# Patient Record
Sex: FEMALE | Race: WHITE | NOT HISPANIC OR LATINO | Employment: OTHER | URBAN - METROPOLITAN AREA
[De-identification: names, ages, dates, MRNs, and addresses within clinical notes are randomized per-mention and may not be internally consistent; named-entity substitution may affect disease eponyms.]

---

## 2018-12-10 ENCOUNTER — OFFICE VISIT (OUTPATIENT)
Dept: FAMILY MEDICINE | Facility: CLINIC | Age: 48
End: 2018-12-10
Payer: COMMERCIAL

## 2018-12-10 VITALS
OXYGEN SATURATION: 98 % | DIASTOLIC BLOOD PRESSURE: 82 MMHG | HEART RATE: 67 BPM | RESPIRATION RATE: 16 BRPM | WEIGHT: 160.4 LBS | SYSTOLIC BLOOD PRESSURE: 138 MMHG | TEMPERATURE: 98.1 F

## 2018-12-10 DIAGNOSIS — F32.A ANXIETY AND DEPRESSION: ICD-10-CM

## 2018-12-10 DIAGNOSIS — M19.90 ARTHRITIS: ICD-10-CM

## 2018-12-10 DIAGNOSIS — Z00.00 ROUTINE PHYSICAL EXAMINATION: Primary | ICD-10-CM

## 2018-12-10 DIAGNOSIS — F41.9 ANXIETY AND DEPRESSION: ICD-10-CM

## 2018-12-10 PROBLEM — M16.0 PRIMARY OSTEOARTHRITIS OF BOTH HIPS: Status: ACTIVE | Noted: 2018-12-10

## 2018-12-10 PROCEDURE — 99396 PREV VISIT EST AGE 40-64: CPT | Performed by: FAMILY MEDICINE

## 2018-12-10 RX ORDER — ALPRAZOLAM 0.5 MG/1
0.5 TABLET ORAL DAILY PRN
Qty: 30 TABLET | Refills: 0 | Status: SHIPPED | OUTPATIENT
Start: 2018-12-10 | End: 2019-03-17 | Stop reason: SDUPTHER

## 2018-12-10 RX ORDER — MULTIVIT WITH MINERALS/HERBS
TABLET ORAL
COMMUNITY
Start: 2016-12-20 | End: 2020-02-21 | Stop reason: ENTERED-IN-ERROR

## 2018-12-10 RX ORDER — SERTRALINE HYDROCHLORIDE 100 MG/1
100 TABLET, FILM COATED ORAL DAILY
Qty: 90 TABLET | Refills: 1 | Status: SHIPPED | OUTPATIENT
Start: 2018-12-10 | End: 2019-03-20 | Stop reason: SDUPTHER

## 2018-12-10 RX ORDER — MELOXICAM 7.5 MG/1
7.5 TABLET ORAL
Refills: 3 | COMMUNITY
Start: 2018-11-30 | End: 2020-12-11 | Stop reason: ENTERED-IN-ERROR

## 2018-12-10 ASSESSMENT — ENCOUNTER SYMPTOMS
HEMATURIA: 0
ARTHRALGIAS: 1
SINUS PRESSURE: 0
EYE PAIN: 0
CONSTIPATION: 0
FEVER: 0
HEADACHES: 0
DIZZINESS: 0
COUGH: 0
WEAKNESS: 0
CHEST TIGHTNESS: 0
FATIGUE: 0
WHEEZING: 0
RHINORRHEA: 0
NERVOUS/ANXIOUS: 1
BRUISES/BLEEDS EASILY: 0
MYALGIAS: 0
DIARRHEA: 0
SORE THROAT: 0
ABDOMINAL PAIN: 0
SLEEP DISTURBANCE: 1
FREQUENCY: 0
SINUS PAIN: 0
SHORTNESS OF BREATH: 0
CHILLS: 0
PALPITATIONS: 0

## 2018-12-10 NOTE — PROGRESS NOTES
Subjective      Patient ID: Bailey Ordonez is a 48 y.o. female.    She is here for Pe. Pt has been doing ok overall. She is trying follow well balanced meals. Stays well hydrated. She is limited in her activity b/c of her arthritis and saw ortho and placed her mobic and hoping it will help with movement and planning to get hip replacement sometime next yr. Average 7hrs of sleep a night. Normal BMs and urination. Normal menstrual cycles. Due for upcoming annual gyn. UTD with mammo. Due for eye check. UTD with dental exam. UTD with colonoscopy and doing them every 5yrs. Due for fasting labs. Will be getting flu shot next wk. Mood has been ok but does feel she needs to increase zoloft to 100mg and also would like refill of xanax to use as needed.         The following have been reviewed and updated as appropriate in this visit:  Tobacco  Allergies  Meds  Problems  Med Hx  Surg Hx  Fam Hx       Review of Systems   Constitutional: Negative for chills, fatigue and fever.   HENT: Negative for ear pain, postnasal drip, rhinorrhea, sinus pain, sinus pressure and sore throat.    Eyes: Negative for pain and visual disturbance.   Respiratory: Negative for cough, chest tightness, shortness of breath and wheezing.    Cardiovascular: Negative for chest pain and palpitations.   Gastrointestinal: Negative for abdominal pain, constipation and diarrhea.   Genitourinary: Negative for decreased urine volume, frequency and hematuria.   Musculoskeletal: Positive for arthralgias. Negative for myalgias.   Skin: Negative for rash.   Neurological: Negative for dizziness, weakness and headaches.   Hematological: Does not bruise/bleed easily.   Psychiatric/Behavioral: Positive for sleep disturbance. Negative for behavioral problems and suicidal ideas. The patient is nervous/anxious.        Current Outpatient Prescriptions   Medication Sig Dispense Refill   • b complex vitamins tablet take 1 tablet by oral route  every day     •  cholecalciferol, vitamin D3, (VITAMIN D3) 4,000 unit capsule 1 tab PO daily     • ALPRAZolam (XANAX) 0.5 mg tablet Take 1 tablet (0.5 mg total) by mouth daily as needed for anxiety. 30 tablet 0   • meloxicam (MOBIC) 7.5 mg tablet Take 7.5 mg by mouth once daily.  3   • sertraline (ZOLOFT) 100 mg tablet Take 1 tablet (100 mg total) by mouth daily. 90 tablet 1     No current facility-administered medications for this visit.      Past Medical History:   Diagnosis Date   • Anxiety and depression    • Osteoarthritis of both hips      Family History   Problem Relation Age of Onset   • Heart disease Mother    • Hypertension Mother    • Atrial fibrillation Mother    • Heart failure Mother    • Asthma Mother    • Factor V Leiden deficiency Mother    • Pancreatic cancer Father    • Factor V Leiden deficiency Brother      Past Surgical History:   Procedure Laterality Date   • KNEE SURGERY     • TONSILLECTOMY       Social History     Social History   • Marital status:      Spouse name: N/A   • Number of children: N/A   • Years of education: N/A     Occupational History   • Not on file.     Social History Main Topics   • Smoking status: Never Smoker   • Smokeless tobacco: Never Used   • Alcohol use Yes      Comment: social    • Drug use: Unknown   • Sexual activity: Not on file     Other Topics Concern   • Not on file     Social History Narrative   • No narrative on file     Allergies   Allergen Reactions   • Compazine [Prochlorperazine]      Seizure          Objective   /82 (BP Location: Right upper arm, Patient Position: Sitting)   Pulse 67   Temp 36.7 °C (98.1 °F) (Oral)   Resp 16   Wt 72.8 kg (160 lb 6.4 oz)   LMP 11/20/2018   SpO2 98%   Physical Exam   Constitutional: She is oriented to person, place, and time. She appears well-developed and well-nourished.   HENT:   Head: Normocephalic and atraumatic.   Right Ear: External ear normal.   Left Ear: External ear normal.   Eyes: Conjunctivae are normal.  Pupils are equal, round, and reactive to light.   Neck: Normal range of motion. Neck supple. No thyromegaly present.   Cardiovascular: Normal rate, regular rhythm and normal heart sounds.    No murmur heard.  Pulmonary/Chest: Effort normal and breath sounds normal. She has no wheezes.   Abdominal: Soft. Bowel sounds are normal. There is no tenderness.   Musculoskeletal: Normal range of motion. She exhibits no tenderness.   Lymphadenopathy:     She has no cervical adenopathy.   Neurological: She is alert and oriented to person, place, and time. No cranial nerve deficit.   Skin: Skin is warm and dry. No rash noted.   Psychiatric: She has a normal mood and affect.   Nursing note and vitals reviewed.      Assessment/Plan   Problem List Items Addressed This Visit     Anxiety and depression     Could be better so will increase zoloft to 100mg daily and check in about 6wks to see how she is doing. Will refill xanax to use as needed.         Relevant Medications    sertraline (ZOLOFT) 100 mg tablet    ALPRAZolam (XANAX) 0.5 mg tablet      Other Visit Diagnoses     Routine physical examination    -  Primary    Relevant Orders    Comprehensive metabolic panel    TSH 3rd Generation    Lipid panel    CBC    Urinalysis with Reflex Culture    Arthritis        She is concerned she has Psa arthritis so will check labs and have her see rheumo.    Relevant Orders    ARLEN    Rheumatoid factor      Pe- Pt doing well overall. Discussed diet and exercise. UTD with gyn care. UTD with colonoscopy. Will check fasting labs. UTD with dental exam. Will check eye exam.     Layne Kramer, DO  12/10/2018

## 2018-12-11 NOTE — ASSESSMENT & PLAN NOTE
Could be better so will increase zoloft to 100mg daily and check in about 6wks to see how she is doing. Will refill xanax to use as needed.

## 2018-12-20 ENCOUNTER — APPOINTMENT (OUTPATIENT)
Dept: LAB | Age: 48
End: 2018-12-20
Attending: FAMILY MEDICINE
Payer: COMMERCIAL

## 2018-12-20 DIAGNOSIS — M19.90 ARTHRITIS: ICD-10-CM

## 2018-12-20 DIAGNOSIS — Z00.00 ROUTINE PHYSICAL EXAMINATION: ICD-10-CM

## 2018-12-20 LAB
ALBUMIN SERPL-MCNC: 4.1 G/DL (ref 3.4–5)
ALP SERPL-CCNC: 67 IU/L (ref 35–126)
ALT SERPL-CCNC: 47 IU/L (ref 11–54)
ANION GAP SERPL CALC-SCNC: 8 MEQ/L (ref 3–15)
AST SERPL-CCNC: 27 IU/L (ref 15–41)
BACTERIA URNS QL MICRO: 1 /HPF
BILIRUB SERPL-MCNC: 1 MG/DL (ref 0.3–1.2)
BILIRUB UR QL STRIP.AUTO: NEGATIVE MG/DL
BUN SERPL-MCNC: 9 MG/DL (ref 8–20)
CALCIUM SERPL-MCNC: 9.2 MG/DL (ref 8.9–10.3)
CHLORIDE SERPL-SCNC: 100 MEQ/L (ref 98–109)
CHOLEST SERPL-MCNC: 268 MG/DL
CLARITY UR REFRACT.AUTO: CLEAR
CO2 SERPL-SCNC: 28 MEQ/L (ref 22–32)
COLOR UR AUTO: YELLOW
CREAT SERPL-MCNC: 0.7 MG/DL
ERYTHROCYTE [DISTWIDTH] IN BLOOD BY AUTOMATED COUNT: 11.9 % (ref 11.7–14.4)
GFR SERPL CREATININE-BSD FRML MDRD: >60 ML/MIN/1.73M*2
GLUCOSE SERPL-MCNC: 100 MG/DL (ref 70–99)
GLUCOSE UR STRIP.AUTO-MCNC: NEGATIVE MG/DL
HCT VFR BLDCO AUTO: 40.7 %
HDLC SERPL-MCNC: 52 MG/DL
HDLC SERPL: 5.2 {RATIO}
HGB BLD-MCNC: 14.4 G/DL
HGB UR QL STRIP.AUTO: NEGATIVE
HYALINE CASTS #/AREA URNS LPF: ABNORMAL /LPF
KETONES UR STRIP.AUTO-MCNC: NEGATIVE MG/DL
LDLC SERPL CALC-MCNC: 177 MG/DL
LEUKOCYTE ESTERASE UR QL STRIP.AUTO: 1
MCH RBC QN AUTO: 34.1 PG (ref 28–33.2)
MCHC RBC AUTO-ENTMCNC: 35.4 G/DL (ref 32.2–35.5)
MCV RBC AUTO: 96.4 FL (ref 83–98)
NITRITE UR QL STRIP.AUTO: NEGATIVE
NONHDLC SERPL-MCNC: 216 MG/DL
PDW BLD AUTO: 10.9 FL (ref 9.4–12.3)
PH UR STRIP.AUTO: 6 [PH]
PLATELET # BLD AUTO: 236 K/UL
POTASSIUM SERPL-SCNC: 4.3 MEQ/L (ref 3.6–5.1)
PROT SERPL-MCNC: 6.6 G/DL (ref 6–8.2)
PROT UR QL STRIP.AUTO: NEGATIVE
RBC # BLD AUTO: 4.22 M/UL (ref 3.93–5.22)
RBC #/AREA URNS HPF: ABNORMAL /HPF
SODIUM SERPL-SCNC: 136 MEQ/L (ref 136–144)
SP GR UR REFRACT.AUTO: 1.02
SQUAMOUS URNS QL MICRO: 1 /HPF
TRIGL SERPL-MCNC: 194 MG/DL (ref 30–149)
TSH SERPL DL<=0.05 MIU/L-ACNC: 1.62 MIU/L (ref 0.34–5.6)
UROBILINOGEN UR STRIP-ACNC: 0.2 EU/DL
WBC # BLD AUTO: 4.74 K/UL
WBC #/AREA URNS HPF: ABNORMAL /HPF

## 2018-12-20 PROCEDURE — 87086 URINE CULTURE/COLONY COUNT: CPT

## 2018-12-20 PROCEDURE — 80048 BASIC METABOLIC PNL TOTAL CA: CPT | Mod: 59

## 2018-12-20 PROCEDURE — 80061 LIPID PANEL: CPT

## 2018-12-20 PROCEDURE — 86431 RHEUMATOID FACTOR QUANT: CPT

## 2018-12-20 PROCEDURE — 81003 URINALYSIS AUTO W/O SCOPE: CPT

## 2018-12-20 PROCEDURE — 36415 COLL VENOUS BLD VENIPUNCTURE: CPT

## 2018-12-20 PROCEDURE — 84443 ASSAY THYROID STIM HORMONE: CPT

## 2018-12-20 PROCEDURE — 85027 COMPLETE CBC AUTOMATED: CPT

## 2018-12-20 PROCEDURE — 86038 ANTINUCLEAR ANTIBODIES: CPT

## 2018-12-21 LAB
ANA SER QL IA: NEGATIVE
BACTERIA UR CULT: ABNORMAL
BACTERIA UR CULT: ABNORMAL
RHEUMATOID FACT SERPL-ACNC: <20 IU/ML

## 2018-12-23 ENCOUNTER — TELEPHONE (OUTPATIENT)
Dept: FAMILY MEDICINE | Facility: CLINIC | Age: 48
End: 2018-12-23

## 2018-12-23 DIAGNOSIS — E78.00 PURE HYPERCHOLESTEROLEMIA: Primary | ICD-10-CM

## 2019-01-02 ENCOUNTER — TELEPHONE (OUTPATIENT)
Dept: FAMILY MEDICINE | Facility: CLINIC | Age: 49
End: 2019-01-02

## 2019-03-17 DIAGNOSIS — F32.A ANXIETY AND DEPRESSION: ICD-10-CM

## 2019-03-17 DIAGNOSIS — F41.9 ANXIETY AND DEPRESSION: ICD-10-CM

## 2019-03-17 RX ORDER — ALPRAZOLAM 0.5 MG/1
0.5 TABLET ORAL DAILY PRN
Qty: 30 TABLET | Refills: 0 | Status: SHIPPED | OUTPATIENT
Start: 2019-03-17 | End: 2019-08-04 | Stop reason: SDUPTHER

## 2019-03-20 DIAGNOSIS — F32.A ANXIETY AND DEPRESSION: ICD-10-CM

## 2019-03-20 DIAGNOSIS — F41.9 ANXIETY AND DEPRESSION: ICD-10-CM

## 2019-03-20 RX ORDER — SERTRALINE HYDROCHLORIDE 100 MG/1
100 TABLET, FILM COATED ORAL DAILY
Qty: 90 TABLET | Refills: 0 | Status: SHIPPED | OUTPATIENT
Start: 2019-03-20 | End: 2019-09-26 | Stop reason: SDUPTHER

## 2019-03-27 ENCOUNTER — CONSULT (OUTPATIENT)
Dept: FAMILY MEDICINE | Facility: CLINIC | Age: 49
End: 2019-03-27
Payer: COMMERCIAL

## 2019-03-27 VITALS
BODY MASS INDEX: 35.08 KG/M2 | SYSTOLIC BLOOD PRESSURE: 138 MMHG | TEMPERATURE: 97.9 F | RESPIRATION RATE: 18 BRPM | HEIGHT: 72 IN | HEART RATE: 72 BPM | OXYGEN SATURATION: 98 % | DIASTOLIC BLOOD PRESSURE: 82 MMHG | WEIGHT: 259 LBS

## 2019-03-27 DIAGNOSIS — Z01.818 PRE-OPERATIVE CLEARANCE: Primary | ICD-10-CM

## 2019-03-27 DIAGNOSIS — M16.12 PRIMARY OSTEOARTHRITIS OF LEFT HIP: ICD-10-CM

## 2019-03-27 PROCEDURE — 99243 OFF/OP CNSLTJ NEW/EST LOW 30: CPT | Performed by: FAMILY MEDICINE

## 2019-03-27 ASSESSMENT — ENCOUNTER SYMPTOMS
CONSTIPATION: 0
NAUSEA: 0
DIARRHEA: 0
FEVER: 0
VOMITING: 0
UNEXPECTED WEIGHT CHANGE: 0
WEAKNESS: 0
ABDOMINAL DISTENTION: 0
BLOOD IN STOOL: 0
FATIGUE: 0
ADENOPATHY: 0
HEADACHES: 0
DIFFICULTY URINATING: 0
LIGHT-HEADEDNESS: 0
ABDOMINAL PAIN: 0
DIZZINESS: 0
COUGH: 0
PALPITATIONS: 0
WHEEZING: 0
TROUBLE SWALLOWING: 0
SHORTNESS OF BREATH: 0

## 2019-03-27 NOTE — PROGRESS NOTES
Daily Progress Note       Patient ID: Bailey Ordonez is a 48 y.o. female.    HPI    48 year old female presents for pre-operative clearance.     Patient is scheduled for total left hip replacement to be done by Dr Buck Garibay on 4/15/2019 at [facility]. Will be having pre-operative labs done at [place] on 4/10/2019.     She denies chest pain, shortness of breath, palpitations, syncopal or near syncopal episodes.     She reports she is limited with activity due to pain but otherwise able to walk multiple miles. She even uses the stationary bike for 20-30 minutes without limitations.     Her previous cardiac work up includes a CT Heart Coronary Calcium Score done on 8/3/2016 where she had a score of 0.     She does have a hx of Factor V Leiden mutation     No complications with anesthesia in the past.     The following have been reviewed and updated as appropriate in this visit:  Tobacco  Allergies  Meds  Problems  Med Hx  Surg Hx  Fam Hx       Review of Systems   Constitutional: Negative for fatigue, fever and unexpected weight change.   HENT: Negative for trouble swallowing.    Eyes: Negative for visual disturbance.   Respiratory: Negative for cough, shortness of breath and wheezing.    Cardiovascular: Negative for chest pain, palpitations and leg swelling.   Gastrointestinal: Negative for abdominal distention, abdominal pain, blood in stool, constipation, diarrhea, nausea and vomiting.   Endocrine: Negative for cold intolerance and heat intolerance.   Genitourinary: Negative for difficulty urinating.   Skin: Negative for rash.   Neurological: Negative for dizziness, syncope, weakness, light-headedness and headaches.   Hematological: Negative for adenopathy.             Current Outpatient Prescriptions   Medication Sig Dispense Refill   • ALPRAZolam (XANAX) 0.5 mg tablet TAKE 1 TABLET (0.5 MG TOTAL) BY MOUTH DAILY AS NEEDED FOR ANXIETY. 30 tablet 0   • b complex vitamins tablet take 1  tablet by oral route  every day     • cholecalciferol, vitamin D3, (VITAMIN D3) 4,000 unit capsule 1 tab PO daily     • meloxicam (MOBIC) 7.5 mg tablet Take 7.5 mg by mouth once daily.  3   • RED YEAST RICE ORAL Take by mouth.     • sertraline (ZOLOFT) 100 mg tablet Take 1 tablet (100 mg total) by mouth daily. 90 tablet 0     No current facility-administered medications for this visit.      Past Medical History:   Diagnosis Date   • Anxiety and depression    • Osteoarthritis of both hips      Family History   Problem Relation Age of Onset   • Heart disease Mother    • Hypertension Mother    • Atrial fibrillation Mother    • Heart failure Mother    • Asthma Mother    • Factor V Leiden deficiency Mother    • Pancreatic cancer Father    • Factor V Leiden deficiency Brother      Past Surgical History:   Procedure Laterality Date   • KNEE SURGERY     • TONSILLECTOMY       Social History     Social History   • Marital status:      Spouse name: N/A   • Number of children: N/A   • Years of education: N/A     Occupational History   • Not on file.     Social History Main Topics   • Smoking status: Never Smoker   • Smokeless tobacco: Never Used   • Alcohol use Yes      Comment: social    • Drug use: Unknown   • Sexual activity: Not on file     Other Topics Concern   • Not on file     Social History Narrative   • No narrative on file     Allergies   Allergen Reactions   • Compazine [Prochlorperazine]      Seizure          Objective   No new labs.    Vitals:    03/27/19 1549   BP: 138/82   Pulse: 72   Resp: 18   Temp: 36.6 °C (97.9 °F)   SpO2: 98%   Weight: 117 kg (259 lb)   Height: 1.829 m (6')         Physical Exam   Constitutional: She is oriented to person, place, and time. She appears well-developed and well-nourished.   HENT:   Head: Normocephalic and atraumatic.   Eyes: Conjunctivae and EOM are normal. Pupils are equal, round, and reactive to light.   Neck: Normal range of motion. Neck supple.   Cardiovascular:  Normal rate, regular rhythm and normal heart sounds.    Pulmonary/Chest: Effort normal and breath sounds normal. No respiratory distress.   Abdominal: Soft. Bowel sounds are normal. She exhibits no distension. There is no tenderness.   Musculoskeletal: Normal range of motion. She exhibits no edema.   Neurological: She is alert and oriented to person, place, and time.   Skin: Skin is warm and dry.   Nursing note and vitals reviewed.      Assessment/Plan   Problem List Items Addressed This Visit     None      Visit Diagnoses     Pre-operative clearance    -  Primary    Primary osteoarthritis of left hip          After examining the patient and reviewing the preoperative data - pending significant abnormality on EKG and preoperative laboratory examination - I find this patient to be medically stable for total left hip replacement to be done by Dr Buck Garibay on 4/15/2019 at [facility]. Will be having pre-operative labs done on 4/10/2019.       Joss Maciel MD  3/27/2019

## 2019-03-27 NOTE — PATIENT INSTRUCTIONS
Patient Education     Preparing for Hip Replacement  Recovery from hip replacement surgery can be made easier and more comfortable by being prepared before surgery. This includes:  · Arranging for others to help you.  · Preparing your home.  · Preparing your body by having a preoperative exam and being as healthy as you can.  · Doing exercises before your surgery as directed by your health care provider.  You can ease any concerns about your financial responsibilities by calling your insurance company after you decide to have surgery. In addition to asking about your surgery and hospital stay, you will want to ask about coverage for medical equipment, rehabilitation facilities, and home care.  How should I arrange for help?  You will be stronger and more mobile every day. However, in the first couple weeks after surgery, it is unlikely you will be able to do all your daily activities as easily as before your surgery. You may tire easily and will still have limited movement in your leg. Follow these guidelines to best arrange for the help you may need after your surgery:  · Plan to have someone take you home after the procedure. Your health care provider will be able to tell you how many days you can expect to be in the hospital.  · Cancel all work, caregiving, and volunteer responsibilities for at least 4-6 weeks after surgery.  · If you live alone, arrange for someone to care for your home and pets for the first 4-6 weeks after surgery.  · Select someone with whom you feel comfortable to be with you day and night for the first week. This person will help you with your exercises and personal care, such as bathing and using the toilet.  · Arrange for drivers to bring you to and from your follow-up appointments, the grocery store, and other places you may need to go for at least 4-6 weeks.  How should I prepare my home?  · Pick a recovery spot, but do not plan on recovering in bed. Sitting in a more upright position  is better for your health. You may want to use a recliner with a small table nearby. Choose a chair with a firm seat that will not allow you to sink down into it. Chairs and sofas that are too soft can allow your hip to bend at an angle greater than 90 degrees. This could put you at risk for dislocating your new hip joint. Place the items you use most frequently on the small table. These may include the TV remote, a cordless phone, a book or laptop computer, a water glass, and any other items of your choice.  · Remove all clutter from your floors. Also remove any throw rugs.  · To see if you will be able to move in your home with a wheeled walker, hold your hands out about 6 inches (15 cm) from your sides. Walk from your recovery spot to your kitchen and bathroom. Then walk from your bed to the bathroom. If you do not hit anything with your hands, you have enough room.  · Move the items you use most often in your kitchen, bathroom, and bedroom to shelves and drawers that are at countertop height.  · Prepare a few meals to freeze and reheat later.  · Consider adding grab bars in the shower and near the toilet.  · While you are in the hospital, you will learn about equipment that can be helpful for your recovery. Some of the equipment includes raised toilet seats, tub benches, and shower benches.  How should I prepare my body?  · Have a preoperative exam. This will ensure that your body is healthy enough to safely have this surgery. Bring a complete list of all your medicines and supplements, including herbs and vitamins. You may need to have additional tests to ensure your safety.  · Have elective dental care and routine cleanings before your surgery. Germs from anywhere in your body, including your mouth, can travel to your new joint and infect it. It is important not to have any dental work performed for at least 3 months after your surgery. After surgery, be sure to tell your dentist about your joint  replacement.  · Maintain a healthy diet. Do not change your diet before surgery unless advised to do so by your health care provider.  · Do not use any tobacco products, including cigarettes, chewing tobacco, or electronic cigarettes. If you need help quitting, ask your health care provider. Tobacco and nicotine products can delay healing after your surgery.  · The day before your surgery, follow your health care provider’s directions for showering, eating, drinking, and taking medicines. These directions are for your safety.  What kinds of exercises should I do?  Your health care provider may have you do the following exercises before your surgery. Be sure to follow the exercise program only as directed by your health care provider. While completing these exercises, remember to stretch for as long as you can, up to 30 seconds. You should only feel a gentle lengthening or release in the stretched tissue. You should not feel pain.  Ankle Pumps  1. While sitting on a firm surface with your legs straight out in front of you, move your feet at the ankle joints so that your toes are pulling back toward your chest.  2. Reverse the motion, pointing your toes away from you.  3. Repeat 10-20 times. Complete this exercise 1-2 times per day.  Heel Slides  1. Lie on your back with both knees straight. (If this causes back pain, bend one knee, placing your foot flat on the floor. Keep this leg in this position while doing heel slides with the opposite leg.)  2. Slowly slide one heel back toward your buttocks until you feel a gentle stretch in the front of your knee or thigh.  3. Slowly slide your heel back to the starting position.  4. Repeat 10-20 times, then switch heels and do it again. Complete this exercise 1-2 times per day.  Quadriceps Sets  1. Lie on your back with one leg extended and your opposite knee bent.  2. Gradually tighten the muscles in the front of the thigh of your extended leg. This motion will push the back  of the knee down toward the floor.  3. Hold the muscle as tightly as you can without causing pain for 10 seconds.  4. Relax the muscles slowly and completely in between each repetition.  5. Repeat 10-20 times, then switch legs and do it again. Complete this exercise 1-2 times per day.  Short Arc Kicks  1. Lie on your back. Place a rolled towel (4-6 inches [10-15 cm] high) under one knee so that the knee slightly bends.  2. Raise only the lower leg of your slightly bent leg by tightening the muscles in the front of your thigh. Do not allow your thigh to rise.  3. Hold this position for 5 seconds.  4. Repeat 10-20 times, then switch legs and do it again. Complete this exercise 1-2 times per day.  Straight Leg Raises  1. Lie on your back with one leg extended and your opposite knee bent.  2. Tighten the muscles in the front of the thigh of your extended leg. Your thigh may shake slightly.  3. Tighten these muscles even more and raise your leg 4-6 inches off the floor. Hold for 3-5 seconds.  4. Keeping these muscles tight, lower your leg.  5. Relax the muscles slowly and completely in between each repetition.  6. Repeat 10-20 times, then switch legs and do it again. Complete this exercise 1-2 times per day.  Arm Chair Push-ups  1. Find a firm, non-wheeled chair with solid armrests.  2. Sitting in the chair, extend one leg straight out in front of you.  3. Lift up your body weight, using your arms and opposite leg.  4. Slowly lower your body weight.  5. Repeat 10-20 times, then switch legs and do it again. Complete this exercise 1-2 times per day.  This information is not intended to replace advice given to you by your health care provider. Make sure you discuss any questions you have with your health care provider.  Document Released: 03/23/2012 Document Revised: 05/22/2017 Document Reviewed: 03/12/2015  Elsevier Interactive Patient Education © 2017 [employer]

## 2019-04-11 ENCOUNTER — TELEPHONE (OUTPATIENT)
Dept: FAMILY MEDICINE | Facility: CLINIC | Age: 49
End: 2019-04-11

## 2019-04-11 NOTE — TELEPHONE ENCOUNTER
Form was completed by Dr. Maciel and faxed to number on form and confirmation received.  Pt was notified, pw scanned into chart

## 2019-04-11 NOTE — TELEPHONE ENCOUNTER
Please keep an eye out for patient's preop labs. Had drawn yesterday at [facility]. She says they may be coming over by fax addressed to Dr Kramer, as they could not find Dr Maciel in their system. Will need to call [place] if we did not receive. Surgery is 4/15/19.  Then needs to be faxed with preop clearance note to Dr Garibay at [phone], attn: Rehana.  Thank you !

## 2019-08-04 DIAGNOSIS — F32.A ANXIETY AND DEPRESSION: ICD-10-CM

## 2019-08-04 DIAGNOSIS — F41.9 ANXIETY AND DEPRESSION: ICD-10-CM

## 2019-08-04 RX ORDER — ALPRAZOLAM 0.5 MG/1
0.5 TABLET ORAL DAILY PRN
Qty: 30 TABLET | Refills: 0 | Status: SHIPPED | OUTPATIENT
Start: 2019-08-04 | End: 2020-02-21 | Stop reason: SDUPTHER

## 2019-09-26 DIAGNOSIS — F32.A ANXIETY AND DEPRESSION: ICD-10-CM

## 2019-09-26 DIAGNOSIS — F41.9 ANXIETY AND DEPRESSION: ICD-10-CM

## 2019-09-26 RX ORDER — SERTRALINE HYDROCHLORIDE 100 MG/1
100 TABLET, FILM COATED ORAL DAILY
Qty: 90 TABLET | Refills: 1 | Status: SHIPPED | OUTPATIENT
Start: 2019-09-26 | End: 2020-02-21 | Stop reason: SDUPTHER

## 2019-09-26 NOTE — TELEPHONE ENCOUNTER
From: Bailey Ordonez  Sent: 9/26/2019 5:40 AM EDT  Subject: Medication Renewal Request    Bailey Ordonez would like a refill of the following medications:     sertraline (ZOLOFT) 100 mg tablet [Layne Kramer, DO]    Preferred pharmacy: [facility] [id number] [place] [address]  Delivery method: Pickup  Preferred pick-up date and time: 9/27/2019 4:00 PM

## 2020-02-21 ENCOUNTER — OFFICE VISIT (OUTPATIENT)
Dept: FAMILY MEDICINE | Facility: CLINIC | Age: 50
End: 2020-02-21
Payer: COMMERCIAL

## 2020-02-21 VITALS
WEIGHT: 270.2 LBS | TEMPERATURE: 98 F | HEART RATE: 68 BPM | BODY MASS INDEX: 37.83 KG/M2 | SYSTOLIC BLOOD PRESSURE: 130 MMHG | OXYGEN SATURATION: 98 % | RESPIRATION RATE: 16 BRPM | HEIGHT: 71 IN | DIASTOLIC BLOOD PRESSURE: 85 MMHG

## 2020-02-21 DIAGNOSIS — R53.83 OTHER FATIGUE: ICD-10-CM

## 2020-02-21 DIAGNOSIS — Z00.00 ROUTINE PHYSICAL EXAMINATION: Primary | ICD-10-CM

## 2020-02-21 DIAGNOSIS — F32.A ANXIETY AND DEPRESSION: ICD-10-CM

## 2020-02-21 DIAGNOSIS — F41.9 ANXIETY AND DEPRESSION: ICD-10-CM

## 2020-02-21 DIAGNOSIS — R21 RASH: ICD-10-CM

## 2020-02-21 DIAGNOSIS — Z12.39 SCREENING FOR BREAST CANCER: ICD-10-CM

## 2020-02-21 PROCEDURE — 99396 PREV VISIT EST AGE 40-64: CPT | Performed by: FAMILY MEDICINE

## 2020-02-21 RX ORDER — SERTRALINE HYDROCHLORIDE 100 MG/1
100 TABLET, FILM COATED ORAL DAILY
Qty: 90 TABLET | Refills: 1 | Status: SHIPPED | OUTPATIENT
Start: 2020-02-21 | End: 2020-12-15 | Stop reason: SDUPTHER

## 2020-02-21 RX ORDER — ALPRAZOLAM 0.5 MG/1
0.5 TABLET ORAL DAILY PRN
Qty: 30 TABLET | Refills: 0 | Status: SHIPPED | OUTPATIENT
Start: 2020-02-21 | End: 2020-05-21

## 2020-02-21 ASSESSMENT — ENCOUNTER SYMPTOMS
SHORTNESS OF BREATH: 0
EYE PAIN: 0
RHINORRHEA: 0
CHILLS: 0
ABDOMINAL PAIN: 0
MYALGIAS: 0
SINUS PAIN: 0
HEMATURIA: 0
DIARRHEA: 0
CHEST TIGHTNESS: 0
SLEEP DISTURBANCE: 1
FREQUENCY: 0
ARTHRALGIAS: 0
DIZZINESS: 0
NERVOUS/ANXIOUS: 1
BRUISES/BLEEDS EASILY: 0
FEVER: 0
FATIGUE: 0
WEAKNESS: 0
PALPITATIONS: 0
HEADACHES: 0
WHEEZING: 0
SINUS PRESSURE: 0
COUGH: 0
CONSTIPATION: 0
SORE THROAT: 0

## 2020-02-21 NOTE — PROGRESS NOTES
Subjective      Patient ID: Bailey Ordonez is a 49 y.o. female.    She is here for physical. Pt has been doing well overall. She is trying to follow well balanced meals. Stays well hydrated. She is trying to lose weight since her hip surgery. She is planning to get more active soon. Average 7hrs of sleep a night but doesn't sleep well.  She has a new job which has helped her mood as well. Normal BMs and urination. UTD with colonoscopy in 2017. Due for  Eye check. UTD with dental exam. Due for mammo. Due for fasting labs. Has chronic R anterior shin rash over past 6 yrs and will start Dec and go until summer and has seen 2 derm and did biopsy and didn't say anything and trying to keep it moisturizer. It does get itchy. It does get better with steroids. She is thinking about psoriatric arthritis.       The following have been reviewed and updated as appropriate in this visit:  Tobacco  Allergies  Meds  Problems  Med Hx  Surg Hx  Fam Hx       Review of Systems   Constitutional: Negative for chills, fatigue and fever.   HENT: Negative for ear pain, postnasal drip, rhinorrhea, sinus pressure, sinus pain and sore throat.    Eyes: Negative for pain and visual disturbance.   Respiratory: Negative for cough, chest tightness, shortness of breath and wheezing.    Cardiovascular: Negative for chest pain and palpitations.   Gastrointestinal: Negative for abdominal pain, constipation and diarrhea.   Genitourinary: Negative for decreased urine volume, frequency and hematuria.   Musculoskeletal: Negative for arthralgias and myalgias.   Skin: Positive for rash.   Neurological: Negative for dizziness, weakness and headaches.   Hematological: Does not bruise/bleed easily.   Psychiatric/Behavioral: Positive for sleep disturbance. Negative for behavioral problems and suicidal ideas. The patient is nervous/anxious.        Current Outpatient Medications   Medication Sig Dispense Refill   • ALPRAZolam (XANAX) 0.5 mg tablet  Take 1 tablet (0.5 mg total) by mouth daily as needed for anxiety. 30 tablet 0   • meloxicam (MOBIC) 7.5 mg tablet Take 7.5 mg by mouth once daily.  3   • sertraline (ZOLOFT) 100 mg tablet Take 1 tablet (100 mg total) by mouth daily. 90 tablet 1     No current facility-administered medications for this visit.      Past Medical History:   Diagnosis Date   • Anxiety and depression    • Osteoarthritis of both hips      Family History   Problem Relation Age of Onset   • Heart disease Biological Mother    • Hypertension Biological Mother    • Atrial fibrillation Biological Mother    • Heart failure Biological Mother    • Asthma Biological Mother    • Factor V Leiden deficiency Biological Mother    • Pancreatic cancer Biological Father    • Factor V Leiden deficiency Biological Brother      Past Surgical History:   Procedure Laterality Date   • KNEE SURGERY     • TONSILLECTOMY     • TOTAL HIP ARTHROPLASTY Left 2019     Social History     Socioeconomic History   • Marital status:      Spouse name: Not on file   • Number of children: Not on file   • Years of education: Not on file   • Highest education level: Not on file   Occupational History   • Not on file   Social Needs   • Financial resource strain: Not on file   • Food insecurity:     Worry: Not on file     Inability: Not on file   • Transportation needs:     Medical: Not on file     Non-medical: Not on file   Tobacco Use   • Smoking status: Never Smoker   • Smokeless tobacco: Never Used   Substance and Sexual Activity   • Alcohol use: Yes     Comment: social    • Drug use: Never   • Sexual activity: Not on file   Lifestyle   • Physical activity:     Days per week: Not on file     Minutes per session: Not on file   • Stress: Not on file   Relationships   • Social connections:     Talks on phone: Not on file     Gets together: Not on file     Attends Christian service: Not on file     Active member of club or organization: Not on file     Attends meetings of  "clubs or organizations: Not on file     Relationship status: Not on file   • Intimate partner violence:     Fear of current or ex partner: Not on file     Emotionally abused: Not on file     Physically abused: Not on file     Forced sexual activity: Not on file   Other Topics Concern   • Not on file   Social History Narrative   • Not on file     Allergies   Allergen Reactions   • Compazine [Prochlorperazine]      Seizure          Objective   Visit Vitals  /85 (BP Location: Right upper arm, Patient Position: Sitting)   Pulse 68   Temp 36.7 °C (98 °F) (Oral)   Resp 16   Ht 1.803 m (5' 11\")   Wt 123 kg (270 lb 3.2 oz)   SpO2 98%   BMI 37.69 kg/m²     Physical Exam   Constitutional: She is oriented to person, place, and time. She appears well-developed and well-nourished.   HENT:   Head: Normocephalic and atraumatic.   Right Ear: External ear normal.   Left Ear: External ear normal.   Eyes: Pupils are equal, round, and reactive to light. Conjunctivae are normal.   Neck: Normal range of motion. Neck supple. No thyromegaly present.   Cardiovascular: Normal rate, regular rhythm and normal heart sounds.   No murmur heard.  Pulmonary/Chest: Effort normal and breath sounds normal. She has no wheezes.   Abdominal: Soft. Bowel sounds are normal. There is no tenderness.   Musculoskeletal: Normal range of motion. She exhibits no tenderness.   Lymphadenopathy:     She has no cervical adenopathy.   Neurological: She is alert and oriented to person, place, and time. No cranial nerve deficit.   Skin: Skin is warm and dry. Rash noted.   Excoriated skin rash on anterior shin   Psychiatric: She has a normal mood and affect.   Nursing note and vitals reviewed.      Assessment/Plan   Problem List Items Addressed This Visit        Other    Anxiety and depression    Relevant Medications    sertraline (ZOLOFT) 100 mg tablet    ALPRAZolam (XANAX) 0.5 mg tablet      Other Visit Diagnoses     Routine physical examination    -  Primary    " Relevant Orders    Comprehensive metabolic panel    TSH 3rd Generation    Lipid panel    CBC    Rash        Relevant Orders    ARLEN Screen (Automated)    Rheumatoid factor    Celiac reflex panel    Screening for breast cancer        Will check mammo    Relevant Orders    BI SCREENING MAMMOGRAM BILATERAL    Other fatigue        Relevant Orders    Vitamin D 25 hydroxy    Vitamin B12      She is here for physical. Pt has been doing well overall. Discussed diet and exercise. UTD with gyn care but due for mammo. Due for eye check. UTD with dental exam. Will check fasting labs. Will cont current meds. She will see derm to discuss possible psoriatic arthritis    Layne Kramer, DO  2/21/2020

## 2020-05-21 DIAGNOSIS — F32.A ANXIETY AND DEPRESSION: ICD-10-CM

## 2020-05-21 DIAGNOSIS — F41.9 ANXIETY AND DEPRESSION: ICD-10-CM

## 2020-05-21 RX ORDER — ALPRAZOLAM 0.5 MG/1
TABLET ORAL
Qty: 30 TABLET | Refills: 0 | Status: SHIPPED | OUTPATIENT
Start: 2020-05-21 | End: 2020-09-21

## 2020-09-19 DIAGNOSIS — F41.9 ANXIETY AND DEPRESSION: ICD-10-CM

## 2020-09-19 DIAGNOSIS — F32.A ANXIETY AND DEPRESSION: ICD-10-CM

## 2020-09-21 RX ORDER — ALPRAZOLAM 0.5 MG/1
TABLET ORAL
Qty: 30 TABLET | Refills: 0 | Status: SHIPPED | OUTPATIENT
Start: 2020-09-21 | End: 2020-12-16

## 2020-10-05 ENCOUNTER — TELEPHONE (OUTPATIENT)
Dept: PRIMARY CARE | Facility: CLINIC | Age: 50
End: 2020-10-05

## 2020-12-11 ENCOUNTER — CONSULT (OUTPATIENT)
Dept: PRIMARY CARE | Facility: CLINIC | Age: 50
End: 2020-12-11
Payer: COMMERCIAL

## 2020-12-11 VITALS
WEIGHT: 271.8 LBS | BODY MASS INDEX: 38.05 KG/M2 | DIASTOLIC BLOOD PRESSURE: 80 MMHG | TEMPERATURE: 98.1 F | HEIGHT: 71 IN | SYSTOLIC BLOOD PRESSURE: 130 MMHG | OXYGEN SATURATION: 97 % | HEART RATE: 101 BPM | RESPIRATION RATE: 16 BRPM

## 2020-12-11 DIAGNOSIS — Z01.818 PRE-OPERATIVE CLEARANCE: Primary | ICD-10-CM

## 2020-12-11 DIAGNOSIS — D68.51 FACTOR V LEIDEN (CMS/HCC): ICD-10-CM

## 2020-12-11 DIAGNOSIS — F41.9 ANXIETY AND DEPRESSION: ICD-10-CM

## 2020-12-11 DIAGNOSIS — M16.0 PRIMARY OSTEOARTHRITIS OF BOTH HIPS: ICD-10-CM

## 2020-12-11 DIAGNOSIS — F32.A ANXIETY AND DEPRESSION: ICD-10-CM

## 2020-12-11 PROCEDURE — 99213 OFFICE O/P EST LOW 20 MIN: CPT | Performed by: FAMILY MEDICINE

## 2020-12-11 RX ORDER — CIPROFLOXACIN 500 MG/1
500 TABLET ORAL 2 TIMES DAILY
Qty: 10 TABLET | Refills: 0
Start: 2020-12-11 | End: 2020-12-16

## 2020-12-11 ASSESSMENT — ENCOUNTER SYMPTOMS
DIARRHEA: 0
ARTHRALGIAS: 1
SHORTNESS OF BREATH: 0
HEMATURIA: 0
FREQUENCY: 0
ABDOMINAL PAIN: 0
WEAKNESS: 0
SINUS PRESSURE: 0
CHEST TIGHTNESS: 0
HEADACHES: 0
WHEEZING: 0
COUGH: 0
SINUS PAIN: 0
PALPITATIONS: 0
BRUISES/BLEEDS EASILY: 0
CHILLS: 0
SLEEP DISTURBANCE: 0
MYALGIAS: 0
DIZZINESS: 0
FEVER: 0
SORE THROAT: 0
CONSTIPATION: 0
NERVOUS/ANXIOUS: 0
RHINORRHEA: 0
EYE PAIN: 0
FATIGUE: 0

## 2020-12-11 NOTE — PROGRESS NOTES
Subjective      Patient ID: Bailey Ordonez is a 50 y.o. female.    She is here for pre-op clearance for R hip replacement by Dr. Buck Garibay on Dec 14th at [facility] and will be admitted overnight and then will be discharged the following day. She did have EKG,CXR and labs done and they called her this wk and told her urine looks like some bacteria and placed her on cipro for 5 days. She has no issues with anethesia. She does have hx of factor V leiden and last yr with her L hip she had to take xarelto for few wks postop and she will be doing the same thing this time along with compression stockings. Denies CP/SOB/palpitatons.      The following have been reviewed and updated as appropriate in this visit:  Tobacco  Allergies  Meds  Problems  Med Hx  Surg Hx  Fam Hx       Review of Systems   Constitutional: Negative for chills, fatigue and fever.   HENT: Negative for ear pain, postnasal drip, rhinorrhea, sinus pressure, sinus pain and sore throat.    Eyes: Negative for pain and visual disturbance.   Respiratory: Negative for cough, chest tightness, shortness of breath and wheezing.    Cardiovascular: Negative for chest pain and palpitations.   Gastrointestinal: Negative for abdominal pain, constipation and diarrhea.   Genitourinary: Negative for decreased urine volume, frequency and hematuria.   Musculoskeletal: Positive for arthralgias. Negative for myalgias.   Skin: Negative for rash.   Neurological: Negative for dizziness, weakness and headaches.   Hematological: Does not bruise/bleed easily.   Psychiatric/Behavioral: Negative for behavioral problems, sleep disturbance and suicidal ideas. The patient is not nervous/anxious.        Current Outpatient Medications   Medication Sig Dispense Refill   • ALPRAZolam (XANAX) 0.5 mg tablet TAKE 1 TABLET BY MOUTH EVERY DAY AS NEEDED FOR ANXIETY 30 tablet 0   • sertraline (ZOLOFT) 100 mg tablet Take 1 tablet (100 mg total) by mouth daily. 90 tablet 1    • ciprofloxacin (CIPRO) 500 mg tablet Take 1 tablet (500 mg total) by mouth 2 (two) times a day for 5 days. 10 tablet 0     No current facility-administered medications for this visit.      Past Medical History:   Diagnosis Date   • Anxiety and depression    • Factor V Leiden (CMS/HCC)    • Osteoarthritis of both hips      Family History   Problem Relation Age of Onset   • Heart disease Biological Mother    • Hypertension Biological Mother    • Atrial fibrillation Biological Mother    • Heart failure Biological Mother    • Asthma Biological Mother    • Factor V Leiden deficiency Biological Mother    • Pancreatic cancer Biological Father    • Factor V Leiden deficiency Biological Brother      Past Surgical History:   Procedure Laterality Date   • KNEE SURGERY     • TONSILLECTOMY     • TOTAL HIP ARTHROPLASTY Left 2019     Social History     Socioeconomic History   • Marital status:      Spouse name: Not on file   • Number of children: Not on file   • Years of education: Not on file   • Highest education level: Not on file   Occupational History   • Not on file   Social Needs   • Financial resource strain: Not on file   • Food insecurity     Worry: Not on file     Inability: Not on file   • Transportation needs     Medical: Not on file     Non-medical: Not on file   Tobacco Use   • Smoking status: Never Smoker   • Smokeless tobacco: Never Used   Substance and Sexual Activity   • Alcohol use: Yes     Comment: social    • Drug use: Never   • Sexual activity: Not on file   Lifestyle   • Physical activity     Days per week: Not on file     Minutes per session: Not on file   • Stress: Not on file   Relationships   • Social connections     Talks on phone: Not on file     Gets together: Not on file     Attends Mu-ism service: Not on file     Active member of club or organization: Not on file     Attends meetings of clubs or organizations: Not on file     Relationship status: Not on file   • Intimate partner  "violence     Fear of current or ex partner: Not on file     Emotionally abused: Not on file     Physically abused: Not on file     Forced sexual activity: Not on file   Other Topics Concern   • Not on file   Social History Narrative   • Not on file     Allergies   Allergen Reactions   • Prochlorperazine Other (see comments)     Seizure          Objective   Visit Vitals  /80 (BP Location: Right upper arm, Patient Position: Sitting)   Pulse (!) 101   Temp 36.7 °C (98.1 °F) (Oral)   Resp 16   Ht 1.803 m (5' 11\")   Wt 123 kg (271 lb 12.8 oz)   LMP 10/14/2020 (Exact Date)   SpO2 97%   BMI 37.91 kg/m²     Physical Exam  Vitals signs and nursing note reviewed.   Constitutional:       Appearance: She is well-developed.   HENT:      Head: Normocephalic and atraumatic.      Right Ear: Tympanic membrane and external ear normal.      Left Ear: Tympanic membrane and external ear normal.      Nose: Nose normal.      Mouth/Throat:      Mouth: Mucous membranes are moist.      Pharynx: Oropharynx is clear. No posterior oropharyngeal erythema.   Eyes:      Conjunctiva/sclera: Conjunctivae normal.      Pupils: Pupils are equal, round, and reactive to light.   Neck:      Musculoskeletal: Normal range of motion and neck supple.      Thyroid: No thyromegaly.   Cardiovascular:      Rate and Rhythm: Normal rate and regular rhythm.      Heart sounds: Normal heart sounds. No murmur.   Pulmonary:      Effort: Pulmonary effort is normal.      Breath sounds: Normal breath sounds. No wheezing.   Abdominal:      General: Bowel sounds are normal.      Palpations: Abdomen is soft.      Tenderness: There is no abdominal tenderness.   Musculoskeletal: Normal range of motion.         General: No tenderness.   Lymphadenopathy:      Cervical: No cervical adenopathy.   Skin:     General: Skin is warm and dry.      Findings: No rash.   Neurological:      General: No focal deficit present.      Mental Status: She is alert and oriented to person, " place, and time.      Cranial Nerves: No cranial nerve deficit.   Psychiatric:         Mood and Affect: Mood normal.         Behavior: Behavior normal.         Assessment/Plan   Problem List Items Addressed This Visit        Musculoskeletal    Primary osteoarthritis of both hips       Hematologic    Factor V Leiden (CMS/HCC)       Other    Anxiety and depression      Other Visit Diagnoses     Pre-operative clearance    -  Primary      She is doing ok overall. She is asym. Her labs and EKG are stable. She will be medically cleared for proposed surgery and will make sure she clarifies with surgeon her anticoag management post op. She will keep me updated.     Layne Kramer, DO  12/11/2020

## 2020-12-15 DIAGNOSIS — F41.9 ANXIETY AND DEPRESSION: ICD-10-CM

## 2020-12-15 DIAGNOSIS — F32.A ANXIETY AND DEPRESSION: ICD-10-CM

## 2020-12-15 RX ORDER — SERTRALINE HYDROCHLORIDE 100 MG/1
100 TABLET, FILM COATED ORAL DAILY
Qty: 90 TABLET | Refills: 1 | Status: SHIPPED | OUTPATIENT
Start: 2020-12-15 | End: 2023-05-12 | Stop reason: ALTCHOICE

## 2020-12-16 RX ORDER — ALPRAZOLAM 0.5 MG/1
TABLET ORAL
Qty: 30 TABLET | Refills: 0 | Status: SHIPPED | OUTPATIENT
Start: 2020-12-16 | End: 2023-05-12 | Stop reason: SDUPTHER

## 2021-01-18 ENCOUNTER — HOSPITAL ENCOUNTER (OUTPATIENT)
Dept: RADIOLOGY | Age: 51
Discharge: HOME | End: 2021-01-18
Attending: FAMILY MEDICINE
Payer: COMMERCIAL

## 2021-01-18 DIAGNOSIS — Z12.39 SCREENING FOR BREAST CANCER: ICD-10-CM

## 2021-01-18 PROCEDURE — 77063 BREAST TOMOSYNTHESIS BI: CPT

## 2021-01-19 ENCOUNTER — TRANSCRIBE ORDERS (OUTPATIENT)
Dept: REGISTRATION | Age: 51
End: 2021-01-19

## 2021-01-19 DIAGNOSIS — R92.8 OTHER ABNORMAL AND INCONCLUSIVE FINDINGS ON DIAGNOSTIC IMAGING OF BREAST: Primary | ICD-10-CM

## 2021-01-22 ENCOUNTER — TELEPHONE (OUTPATIENT)
Dept: PRIMARY CARE | Facility: CLINIC | Age: 51
End: 2021-01-22

## 2021-02-09 ENCOUNTER — HOSPITAL ENCOUNTER (OUTPATIENT)
Dept: RADIOLOGY | Age: 51
Discharge: HOME | End: 2021-02-09
Attending: RADIOLOGY
Payer: COMMERCIAL

## 2021-02-09 DIAGNOSIS — R92.8 OTHER ABNORMAL AND INCONCLUSIVE FINDINGS ON DIAGNOSTIC IMAGING OF BREAST: ICD-10-CM

## 2021-02-09 PROCEDURE — 76642 ULTRASOUND BREAST LIMITED: CPT | Mod: RT

## 2021-02-09 PROCEDURE — 77065 DX MAMMO INCL CAD UNI: CPT | Mod: RT

## 2021-04-15 DIAGNOSIS — Z23 ENCOUNTER FOR IMMUNIZATION: ICD-10-CM

## 2022-08-09 ENCOUNTER — TELEPHONE (OUTPATIENT)
Dept: PRIMARY CARE | Facility: CLINIC | Age: 52
End: 2022-08-09
Payer: COMMERCIAL

## 2022-11-26 ENCOUNTER — NURSE TRIAGE (OUTPATIENT)
Dept: INTERNAL MEDICINE | Facility: CLINIC | Age: 52
End: 2022-11-26
Payer: COMMERCIAL

## 2022-11-26 NOTE — TELEPHONE ENCOUNTER
Care Advice Given     Given By Given At Modified    Quin Durham CRNP 11/26/2022  3:27 PM No    SEE PCP WITHIN 2 WEEKS: You need an evaluation for this ongoing problem within the next 2 weeks. Call your doctor during regular office hours and make an appointment.    Quin Durham CRNP 11/26/2022  3:27 PM No    PRESCRIPTION MEDICATIONS FOR COLD SORES:    * Cold sores occur on one side of the outer lip. They are a recurrent problem in 20% of normal adults. A cold sore usually lasts about 7-10 days.   * There is no cure for cold sores.    * However, there are prescription anti-viral medications that can reduce the pain and decrease the duration by about 2 days.    Quin Durham CRNP 11/26/2022  3:27 PM No    PAIN MEDICINES:  * For pain relief, take acetaminophen, ibuprofen, or naproxen.  * Use the lowest amount that makes your pain feel better.  ACETAMINOPHEN (E.G., TYLENOL):   * Take 650 mg (two 325 mg pills) by mouth every 4-6 hours as needed. Each Regular Strength Tylenol pill has 325 mg of acetaminophen. The most you should take each day is 3,250 mg (10 Regular Strength pills a day).  * Another choice is to take 1,000 mg (two 500 mg pills) every 8 hours as needed. Each Extra Strength Tylenol pill has 500 mg of acetaminophen. The most you should take each day is 3,000 mg (6 Extra Strength pills a day).  IBUPROFEN (E.G., MOTRIN, ADVIL):  * Take 400 mg (two 200 mg pills) by mouth every 6 hours as needed.  * Another choice is to take 600 mg (three 200 mg pills) by mouth every 8 hours as needed.  * The most you should take each day is 1,200 mg (six 200 mg pills a day), unless your doctor has told you to take more.  NAPROXEN (E.G., ALEVE):  * Take 220 mg (one 220 mg pill) by mouth every 8 hours as needed. You may take 440 mg (two 220 mg pills) for your first dose.  * The most you should take each day is 660 mg (three 220 mg pills a day), unless your doctor has told you to take more.  EXTRA  "NOTES:  * Acetaminophen is thought to be safer than ibuprofen or naproxen for people over 65 years old. Acetaminophen is in many OTC and prescription medicines. It might be in more than one medicine that you are taking. You need to be careful and not take an overdose. An acetaminophen overdose can hurt the liver.  * [employer], the company that makes Tylenol, has different dosage instructions for Tylenol in [place] and the [place]. In [place], the maximum recommended dose per day is 4,000 mg or twelve (12) Regular-Strength (325 mg) pills. In the [place], [employer] recommends a maximum dose of ten (10) Regular-Strength (325 mg) pills.  * Before taking any medicine, read all the instructions on the package.    Quin Durham CRNP 11/26/2022  3:27 PM No    EXPECTED COURSE: The pain typically subsides over 4-5 days and the sores typically heal over a 7-10 day period. On average, patients note recurrences 2-3 times per year.    Quin Durham CRNP 11/26/2022  3:27 PM No    PREVENTION: Since cold sores are often triggered by exposure to intense sunlight, use a lip balm containing a sunscreen (SPF 30 or higher).    Quin Durham CRNP 11/26/2022  3:27 PM No    CALL BACK IF:    * Sores look infected    * Sores occur near or in the eye    * Sores last over 2 weeks.    Quin Durham CRNP 11/26/2022  3:27 PM No    CARE ADVICE given per Fever Blisters of Lip (Cold Sores) (Adult) guideline.       Patient/Caregiver understands and will follow care advice?  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 APPEARANCE of BLISTERS: \"Describe the sores.\"      Blisters  2. SIZE: \"How large an area is involved with the cold sores?\" (e.g., inches, cm or compare to coins)      Blisters  3. LOCATION: \"Which part " "of the lip is involved?\"      Lip, inside mouth, lower lip is swollen  4. ONSET: \"When did the fever blisters begin?\"      11/25/2022  Felt something coming on 11/24/2022 and applied abreva  5. RECURRENT BLISTERS: \"Have you had fever blisters before?\" If so, ask: \"When was the last time?\" \"How many times a year?\"      Yes  6. OTHER SYMPTOMS: \"Do you have any other symptoms?\" (e.g., fever, sores inside mouth)      Sores inside the mouth  Denies fever, chills, redness. +itching  Denies new foods, medication  Denies body aches, trouble swallowing, wheezing, shortness of breath, chest pain  7. PREGNANCY: \"Is there any chance you are pregnant?\" \"When was your last menstrual period?\"      N/A    Protocols used: COLD SORES (FEVER BLISTERS)-ADULT-AH      "

## 2022-11-30 ENCOUNTER — TELEPHONE (OUTPATIENT)
Dept: PRIMARY CARE | Facility: CLINIC | Age: 52
End: 2022-11-30
Payer: COMMERCIAL

## 2022-11-30 NOTE — TELEPHONE ENCOUNTER
Patient returned call to [place], she is a  and it is hard to get to her phone. Tomorrow morning is pretty open for her if someone can call her back then.

## 2022-12-01 NOTE — TELEPHONE ENCOUNTER
Spoke with Bailey, she reports that yes it was for cold sores. She is unsure of the dosage but thinks it was Valtrex 1,000mg. The script was written for 7 days but she only took it for 3-4 days. Patient is going to check her after visit summary when she gets home and send us a portal message with that information.

## 2022-12-01 NOTE — TELEPHONE ENCOUNTER
So I am assuming this is cold sores? Also would like to know the dose and how long she took the valtrex for so we can have this for the future!

## 2022-12-01 NOTE — TELEPHONE ENCOUNTER
Spoke with Bailey, she reports that she spoke with on call over the weekend. She began with this tingling feeling that started on Thanksgiving and then developed into spots around her lip. Saturday it then went up to her cheek. This was the 1st time that its ever happened this bad. On-call referred her to go to Urgent Care. [facility] [place] [facility]will request records). They placed her on Valtrex that was written for 7 days. She is unsure of the dosage she was placed on since she was at work at the time of the phone call.Took it until Tuesday and was feeling so much better. She is currently just dealing with lips being dry. All the swelling and pain has gone away.Bailey had mentioned to me that this happens a lot but not as bad as it did this time, but wants to know if this can become a prescription that Dr. Kramer prescribes for her when she needs it

## 2023-03-02 ENCOUNTER — HOSPITAL ENCOUNTER (OUTPATIENT)
Dept: RADIOLOGY | Facility: CLINIC | Age: 53
Discharge: HOME | End: 2023-03-02
Attending: NURSE PRACTITIONER
Payer: COMMERCIAL

## 2023-03-02 ENCOUNTER — TRANSCRIBE ORDERS (OUTPATIENT)
Dept: REGISTRATION | Facility: CLINIC | Age: 53
End: 2023-03-02

## 2023-03-02 DIAGNOSIS — Z12.31 ENCOUNTER FOR SCREENING MAMMOGRAM FOR MALIGNANT NEOPLASM OF BREAST: ICD-10-CM

## 2023-03-02 DIAGNOSIS — Z12.31 ENCOUNTER FOR SCREENING MAMMOGRAM FOR MALIGNANT NEOPLASM OF BREAST: Primary | ICD-10-CM

## 2023-03-02 PROCEDURE — 77063 BREAST TOMOSYNTHESIS BI: CPT

## 2023-03-10 ENCOUNTER — HOSPITAL ENCOUNTER (OUTPATIENT)
Dept: RADIOLOGY | Age: 53
Discharge: HOME | End: 2023-03-10
Attending: RADIOLOGY
Payer: COMMERCIAL

## 2023-03-10 DIAGNOSIS — R92.8 ABNORMAL MAMMOGRAM: ICD-10-CM

## 2023-03-10 PROCEDURE — 77065 DX MAMMO INCL CAD UNI: CPT | Mod: LT

## 2023-03-10 PROCEDURE — 76642 ULTRASOUND BREAST LIMITED: CPT | Mod: LT

## 2023-03-16 ENCOUNTER — TELEPHONE (OUTPATIENT)
Dept: PRIMARY CARE | Facility: CLINIC | Age: 53
End: 2023-03-16

## 2023-03-16 ENCOUNTER — OFFICE VISIT (OUTPATIENT)
Dept: PRIMARY CARE | Facility: CLINIC | Age: 53
End: 2023-03-16
Payer: COMMERCIAL

## 2023-03-16 ENCOUNTER — HOSPITAL ENCOUNTER (OUTPATIENT)
Dept: RADIOLOGY | Facility: CLINIC | Age: 53
Discharge: HOME | End: 2023-03-16
Attending: STUDENT IN AN ORGANIZED HEALTH CARE EDUCATION/TRAINING PROGRAM
Payer: COMMERCIAL

## 2023-03-16 ENCOUNTER — NURSE TRIAGE (OUTPATIENT)
Dept: PRIMARY CARE | Facility: CLINIC | Age: 53
End: 2023-03-16

## 2023-03-16 VITALS
BODY MASS INDEX: 36.84 KG/M2 | RESPIRATION RATE: 16 BRPM | WEIGHT: 272 LBS | DIASTOLIC BLOOD PRESSURE: 80 MMHG | HEART RATE: 88 BPM | OXYGEN SATURATION: 96 % | SYSTOLIC BLOOD PRESSURE: 128 MMHG | TEMPERATURE: 97.6 F | HEIGHT: 72 IN

## 2023-03-16 DIAGNOSIS — R05.1 ACUTE COUGH: ICD-10-CM

## 2023-03-16 DIAGNOSIS — R05.1 ACUTE COUGH: Primary | ICD-10-CM

## 2023-03-16 DIAGNOSIS — D68.51 FACTOR V LEIDEN (CMS/HCC): ICD-10-CM

## 2023-03-16 DIAGNOSIS — J18.9 PNEUMONIA OF RIGHT UPPER LOBE DUE TO INFECTIOUS ORGANISM: ICD-10-CM

## 2023-03-16 PROCEDURE — 3008F BODY MASS INDEX DOCD: CPT | Performed by: STUDENT IN AN ORGANIZED HEALTH CARE EDUCATION/TRAINING PROGRAM

## 2023-03-16 PROCEDURE — 99213 OFFICE O/P EST LOW 20 MIN: CPT | Performed by: STUDENT IN AN ORGANIZED HEALTH CARE EDUCATION/TRAINING PROGRAM

## 2023-03-16 PROCEDURE — 71046 X-RAY EXAM CHEST 2 VIEWS: CPT

## 2023-03-16 RX ORDER — METHYLPREDNISOLONE 4 MG/1
TABLET ORAL
Qty: 21 TABLET | Refills: 0 | Status: SHIPPED | OUTPATIENT
Start: 2023-03-16 | End: 2023-03-20 | Stop reason: ALTCHOICE

## 2023-03-16 RX ORDER — BENZONATATE 100 MG/1
100 CAPSULE ORAL 3 TIMES DAILY PRN
Qty: 15 CAPSULE | Refills: 0 | Status: SHIPPED | OUTPATIENT
Start: 2023-03-16 | End: 2023-03-20 | Stop reason: ALTCHOICE

## 2023-03-16 RX ORDER — METFORMIN HYDROCHLORIDE 500 MG/1
TABLET, EXTENDED RELEASE ORAL
COMMUNITY
Start: 2023-02-10

## 2023-03-16 RX ORDER — AMOXICILLIN AND CLAVULANATE POTASSIUM 875; 125 MG/1; MG/1
1 TABLET, FILM COATED ORAL 2 TIMES DAILY
Qty: 14 TABLET | Refills: 0 | Status: SHIPPED | OUTPATIENT
Start: 2023-03-16 | End: 2023-03-23

## 2023-03-16 ASSESSMENT — ENCOUNTER SYMPTOMS: COUGH: 1

## 2023-03-16 NOTE — TELEPHONE ENCOUNTER
"Patient called in stating over the weekend she was feeling extremely sick and could not get out of bed. She stated Monday she went to Urgent Care. She was tested for rapid flu and Covid, both negative. COVID PCR also negative. She stated they did not test her for RSV, as they did not have the test available. She was given a Z-Pack, in which tomorrow will be her last dose. She states it did not help at all. She has been taking theraflu, tylenol pm, and day quil (last time this morning). She states she has a productive cough with clear phlegm, no fever since the Tuesday. She feels a \"rattling\" in her chest, and then she begins to cough. No breathing distress during the call, nor reported.   "

## 2023-03-16 NOTE — ASSESSMENT & PLAN NOTE
See HPI.  VSS.     Plan:  -CXR showes RUL PNA  -Complete Z-pack, Start Augmentin DS BID x 7 d   -Hold Medrol dose pack  -Start PRN Tessalon perles  -Supportive care discussed in detail  -Strict return to office/ER precautions discussed  -Radiology recommended follow up imaging. Will clarify recommendations

## 2023-03-16 NOTE — PROGRESS NOTES
"Subjective      Patient ID: Bailey Ordonez is a 52 y.o. female here for the following:   Cough (Pt states that she was sick on Friday but [facility] Tested covid/ flu and tested negative. Gave her a zpack and last one will be tomorrow. Says cough is not subsiding. )      HPI    #Chronic cough  \"Over the weekend she was feeling extremely sick and could not get out of bed. She stated Monday she went to Urgent Care. She was tested for rapid flu and Covid, both negative. COVID PCR also negative. She stated they did not test her for RSV, as they did not have the test available. She was given a Z-Pack, in which tomorrow will be her last dose. She states it did not help at all. She has been taking theraflu, tylenol pm, and day quil (last time this morning). She states she has a productive cough with clear phlegm, no fever since the Tuesday. She feels a \"rattling\" in her chest, and then she begins to cough. No breathing distress.\"    The following have been reviewed and updated as appropriate in this visit:      Allergies  Meds  Problems  Social History      Patient Active Problem List   Diagnosis   • Anxiety and depression   • Primary osteoarthritis of both hips   • Factor V Leiden (CMS/HCC)   • Right upper lobe pneumonia    .    Social History     Tobacco Use   • Smoking status: Never   • Smokeless tobacco: Never   Substance Use Topics   • Alcohol use: Yes     Comment: social    • Drug use: Never       Allergies as of 03/16/2023 - Reviewed 03/16/2023   Allergen Reaction Noted   • Prochlorperazine Other (see comments) 12/10/2018         Current Outpatient Medications:   •  ALPRAZolam (XANAX) 0.5 mg tablet, TAKE 1 TABLET BY MOUTH EVERY DAY AS NEEDED FOR ANXIETY, Disp: 30 tablet, Rfl: 0  •  amoxicillin-pot clavulanate (AUGMENTIN) 875-125 mg per tablet, Take 1 tablet by mouth 2 (two) times a day for 7 days., Disp: 14 tablet, Rfl: 0  •  benzonatate (TESSALON PERLES) 100 mg capsule, Take 1 capsule " (100 mg total) by mouth 3 (three) times a day as needed for cough for up to 5 days., Disp: 15 capsule, Rfl: 0  •  methylPREDNISolone (MEDROL DOSEPACK) 4 mg tablet, Follow package directions., Disp: 21 tablet, Rfl: 0  •  sertraline (ZOLOFT) 100 mg tablet, Take 1 tablet (100 mg total) by mouth daily., Disp: 90 tablet, Rfl: 1  •  valACYclovir (VALTREX) 1 gram tablet, Take 1 tablet (1,000 mg total) by mouth 3 (three) times a day as needed (cold sores) for up to 7 days., Disp: 30 tablet, Rfl: 0  •  metFORMIN XR (GLUCOPHAGE-XR) 500 mg 24 hr tablet, TAKE 1 TABLET BY MOUTH IN THE EVENING WITH FOOD, Disp: , Rfl:       Review of systems as per HPI and below    Review of Systems   Respiratory: Positive for cough.    All other systems reviewed and are negative.    Objective   Vitals:   Vitals:    03/16/23 1450   BP: 128/80   BP Location: Left upper arm   Patient Position: Sitting   Pulse: 88   Resp: 16   Temp: 36.4 °C (97.6 °F)   TempSrc: Temporal   SpO2: 96%   Weight: 123 kg (272 lb)   Height: 1.829 m (6')     Body mass index is 36.89 kg/m².    Physical Exam  Constitutional:       General: She is not in acute distress.     Appearance: Normal appearance. She is not ill-appearing.   HENT:      Head: Normocephalic.      Nose: Nose normal.      Mouth/Throat:      Mouth: Mucous membranes are moist.   Eyes:      Extraocular Movements: Extraocular movements intact.      Conjunctiva/sclera: Conjunctivae normal.      Pupils: Pupils are equal, round, and reactive to light.   Cardiovascular:      Rate and Rhythm: Normal rate and regular rhythm.      Pulses: Normal pulses.      Heart sounds: Normal heart sounds. No murmur heard.     No friction rub. No gallop.   Pulmonary:      Effort: Pulmonary effort is normal. No respiratory distress.      Breath sounds: No stridor. No wheezing, rhonchi or rales.      Comments: Difficult to hear lower lung breath sounds   Chest:      Chest wall: No tenderness.   Musculoskeletal:         General: Normal  range of motion.      Cervical back: Normal range of motion. No rigidity.   Lymphadenopathy:      Cervical: No cervical adenopathy.   Skin:     General: Skin is warm.      Capillary Refill: Capillary refill takes less than 2 seconds.   Neurological:      General: No focal deficit present.      Mental Status: She is alert and oriented to person, place, and time.   Psychiatric:         Mood and Affect: Mood normal.         Behavior: Behavior normal.         ASSESSMENT & PLAN    Problem List Items Addressed This Visit        Respiratory    Right upper lobe pneumonia - Primary     See HPI.  VSS.     Plan:  -CXR showes RUL PNA  -Complete Z-pack, Start Augmentin DS BID x 7 d   -Hold Medrol dose pack  -Start PRN Tessalon perles  -Supportive care discussed in detail  -Strict return to office/ER precautions discussed  -Radiology recommended follow up imaging. Will clarify recommendations         Relevant Medications    methylPREDNISolone (MEDROL DOSEPACK) 4 mg tablet    benzonatate (TESSALON PERLES) 100 mg capsule    amoxicillin-pot clavulanate (AUGMENTIN) 875-125 mg per tablet       Hematologic    Factor V Leiden (CMS/HCC)       Orders Placed This Encounter   Procedures   • X-RAY CHEST 2 VIEWS     Standing Status:   Future     Number of Occurrences:   1     Standing Expiration Date:   3/16/2024     Order Specific Question:   Is the patient pregnant?     Answer:   Unknown     Order Specific Question:   Release to patient     Answer:   Immediate           _____________________  Bela Flanagan MD  03/16/23

## 2023-03-16 NOTE — PATIENT INSTRUCTIONS
POSTNASAL DRIP:  -Flonase to decrease nasal inflammation, help decrease mucus production. Use twice daily, 2 pumps in each nostril in the morning and before bed.   -Antihistamine to dry out secretions. Benadryl when at home relaxing and at night when going to sleep because it makes you sleepy. If you have things to do during the day I recommend a non-drowsy antihistamine such as Zyrtec or Claritin.

## 2023-03-20 ENCOUNTER — OFFICE VISIT (OUTPATIENT)
Dept: PRIMARY CARE | Facility: CLINIC | Age: 53
End: 2023-03-20
Payer: COMMERCIAL

## 2023-03-20 VITALS
RESPIRATION RATE: 16 BRPM | SYSTOLIC BLOOD PRESSURE: 130 MMHG | OXYGEN SATURATION: 99 % | HEIGHT: 72 IN | WEIGHT: 275.8 LBS | DIASTOLIC BLOOD PRESSURE: 82 MMHG | BODY MASS INDEX: 37.36 KG/M2 | TEMPERATURE: 97 F | HEART RATE: 82 BPM

## 2023-03-20 DIAGNOSIS — J18.9 PNEUMONIA OF RIGHT UPPER LOBE DUE TO INFECTIOUS ORGANISM: Primary | ICD-10-CM

## 2023-03-20 PROCEDURE — 99213 OFFICE O/P EST LOW 20 MIN: CPT | Performed by: NURSE PRACTITIONER

## 2023-03-20 PROCEDURE — 3008F BODY MASS INDEX DOCD: CPT | Performed by: NURSE PRACTITIONER

## 2023-03-20 RX ORDER — BROMPHENIRAMINE MALEATE, PSEUDOEPHEDRINE HYDROCHLORIDE, AND DEXTROMETHORPHAN HYDROBROMIDE 2; 30; 10 MG/5ML; MG/5ML; MG/5ML
10 SYRUP ORAL 3 TIMES DAILY PRN
Qty: 118 ML | Refills: 0 | Status: SHIPPED | OUTPATIENT
Start: 2023-03-20 | End: 2023-03-30

## 2023-03-20 RX ORDER — ALBUTEROL SULFATE 90 UG/1
2 INHALANT RESPIRATORY (INHALATION) EVERY 6 HOURS PRN
Qty: 8 G | Refills: 0 | Status: SHIPPED | OUTPATIENT
Start: 2023-03-20 | End: 2023-05-12

## 2023-03-20 ASSESSMENT — ENCOUNTER SYMPTOMS
PALPITATIONS: 0
WHEEZING: 1
NAUSEA: 0
SINUS PRESSURE: 1
SHORTNESS OF BREATH: 1
SORE THROAT: 0
DIZZINESS: 0
DIARRHEA: 0
WEAKNESS: 0
VOMITING: 0
FATIGUE: 1
COUGH: 1
APPETITE CHANGE: 1
HEADACHES: 0
ADENOPATHY: 0

## 2023-03-20 NOTE — PROGRESS NOTES
[address], [address]  [place] [address]  Phone [phone] Fax [phone]     Reason for visit:   Chief Complaint   Patient presents with   • Cough      HPI   Bailey Ordonez is a 52 y.o. female who presents for follow up for right upper lobe pneumonia. She completed course of azithromycin prescribed by urgent care, and continues augmentin started on 3/16.  She c/o persisting cough and chest congestion, nasal congestion, post nasal drip. Cough keeping her awake at night. She is sleeping upright, using incentive spirometer.  Feeling short of breath with activity. +intermittent wheeze.  No chest pain/back pain.  No fever/chills/weakness.  No n/v/d.  Admits decreased appetite  Taking afrin and tessalon perles with min symptom relief.            Medical History:  Past Medical History:   Diagnosis Date   • Anxiety and depression    • Factor V Leiden (CMS/HCC)    • Osteoarthritis of both hips        Surgical History:  Past Surgical History:   Procedure Laterality Date   • KNEE SURGERY     • TONSILLECTOMY     • TOTAL HIP ARTHROPLASTY Left 2019       Social History:  Social History     Social History Narrative   • Not on file       Family History:  Family History   Problem Relation Age of Onset   • Heart disease Biological Mother    • Hypertension Biological Mother    • Atrial fibrillation Biological Mother    • Heart failure Biological Mother    • Asthma Biological Mother    • Factor V Leiden deficiency Biological Mother    • Pancreatic cancer Biological Father    • Factor V Leiden deficiency Biological Brother        Allergies:  Prochlorperazine    Current Medications:  Current Outpatient Medications   Medication Sig Dispense Refill   • albuterol HFA 90 mcg/actuation inhaler Inhale 2 puffs every 6 (six) hours as needed for wheezing or shortness of breath. 8 g 0   • ALPRAZolam (XANAX) 0.5 mg tablet TAKE 1 TABLET BY MOUTH EVERY DAY AS NEEDED FOR ANXIETY 30 tablet 0   •  amoxicillin-pot clavulanate (AUGMENTIN) 875-125 mg per tablet Take 1 tablet by mouth 2 (two) times a day for 7 days. 14 tablet 0   • brompheniramine-pseudoeph-DM 2-30-10 mg/5 mL syrup Take 10 mL by mouth 3 (three) times a day as needed for cough for up to 10 days. 118 mL 0   • metFORMIN XR (GLUCOPHAGE-XR) 500 mg 24 hr tablet TAKE 1 TABLET BY MOUTH IN THE EVENING WITH FOOD     • sertraline (ZOLOFT) 100 mg tablet Take 1 tablet (100 mg total) by mouth daily. 90 tablet 1   • valACYclovir (VALTREX) 1 gram tablet Take 1 tablet (1,000 mg total) by mouth 3 (three) times a day as needed (cold sores) for up to 7 days. 30 tablet 0     No current facility-administered medications for this visit.       Review of Systems:  Review of Systems   Constitutional: Positive for appetite change (decreased) and fatigue.   HENT: Positive for congestion, postnasal drip and sinus pressure. Negative for ear pain and sore throat.    Respiratory: Positive for cough, shortness of breath and wheezing.    Cardiovascular: Negative for chest pain and palpitations.   Gastrointestinal: Negative for diarrhea, nausea and vomiting.   Neurological: Negative for dizziness, weakness and headaches.   Hematological: Negative for adenopathy.       Objective     Vital Signs:  Vitals:    03/20/23 1003   BP: 130/82   Pulse: 82   Resp: 16   Temp: 36.1 °C (97 °F)   SpO2: 99%       BMI:  Body mass index is 37.41 kg/m².     Physical Exam  Vitals reviewed.   Constitutional:       Appearance: Normal appearance.   HENT:      Head: Normocephalic and atraumatic.      Right Ear: Tympanic membrane normal.      Left Ear: Tympanic membrane normal.      Nose: Rhinorrhea present.   Cardiovascular:      Rate and Rhythm: Normal rate.      Heart sounds: Normal heart sounds.   Pulmonary:      Effort: Pulmonary effort is normal.      Breath sounds: Wheezing (expiratory wheezes bilaterally) present.   Lymphadenopathy:      Cervical: No cervical adenopathy.   Neurological:      Mental  Status: She is alert and oriented to person, place, and time.   Psychiatric:         Mood and Affect: Mood normal.         Behavior: Behavior normal.         Recent labs before today:     Lab Results   Component Value Date    WBC 4.74 12/20/2018    HGB 14.4 12/20/2018    HCT 40.7 12/20/2018     12/20/2018    CHOL 268 (H) 12/20/2018    TRIG 194 (H) 12/20/2018    HDL 52 (L) 12/20/2018    ALT 47 12/20/2018    AST 27 12/20/2018     12/20/2018    K 4.3 12/20/2018     12/20/2018    CREATININE 0.7 12/20/2018    BUN 9 12/20/2018    CO2 28 12/20/2018    TSH 1.62 12/20/2018    HGBA1C 5.0 07/15/2016        Procedures       Problem List Items Addressed This Visit        Respiratory    Pneumonia of right upper lobe due to infectious organism - Primary     Completed azithromycin.  Continue augmentin as prescribed.   Albuterol inhaler as needed for sob/wheeze, and cough medicine as directed.   Continue supportive care: rest, adequate hydration, use of humidifier.   Will continue to closely monitor symptoms. Strict return to office/ER precautions reviewed.           Relevant Medications    albuterol HFA 90 mcg/actuation inhaler    brompheniramine-pseudoeph-DM 2-30-10 mg/5 mL syrup               JAIME Zapata  3/20/2023

## 2023-03-20 NOTE — LETTER
March 20, 2023     Patient: Bailey Ordonez  YOB: 1970  Date of Visit: 3/20/2023    To Whom it May Concern:    Bailey Ordonez was seen in my clinic on 3/20/2023 at 10:00 am.    If you have any questions or concerns, please don't hesitate to call.         Sincerely,         JAIME Zapata

## 2023-03-20 NOTE — ASSESSMENT & PLAN NOTE
Completed azithromycin.  Continue augmentin as prescribed.   Albuterol inhaler as needed for sob/wheeze, and cough medicine as directed.   Continue supportive care: rest, adequate hydration, use of humidifier.   Will continue to closely monitor symptoms. Strict return to office/ER precautions reviewed.

## 2023-03-22 ENCOUNTER — TELEPHONE (OUTPATIENT)
Dept: PRIMARY CARE | Facility: CLINIC | Age: 53
End: 2023-03-22
Payer: COMMERCIAL

## 2023-03-22 DIAGNOSIS — J18.9 PNEUMONIA OF RIGHT UPPER LOBE DUE TO INFECTIOUS ORGANISM: Primary | ICD-10-CM

## 2023-03-22 NOTE — TELEPHONE ENCOUNTER
I called patient to check in,  Still w/ persisting cough.   Did return to work for half days the last 2 days.   No worsened s/s. No fever/chills.  Will continue to closely monitor.  Repeat chest xr in 4 wk

## 2023-03-28 DIAGNOSIS — J18.9 PNEUMONIA OF RIGHT UPPER LOBE DUE TO INFECTIOUS ORGANISM: Primary | ICD-10-CM

## 2023-04-19 ENCOUNTER — HOSPITAL ENCOUNTER (OUTPATIENT)
Dept: RADIOLOGY | Facility: CLINIC | Age: 53
Discharge: HOME | End: 2023-04-19
Attending: STUDENT IN AN ORGANIZED HEALTH CARE EDUCATION/TRAINING PROGRAM
Payer: COMMERCIAL

## 2023-04-19 DIAGNOSIS — J18.9 PNEUMONIA OF RIGHT UPPER LOBE DUE TO INFECTIOUS ORGANISM: ICD-10-CM

## 2023-04-19 PROCEDURE — 71046 X-RAY EXAM CHEST 2 VIEWS: CPT

## 2023-05-12 ENCOUNTER — OFFICE VISIT (OUTPATIENT)
Dept: PRIMARY CARE | Facility: CLINIC | Age: 53
End: 2023-05-12
Payer: COMMERCIAL

## 2023-05-12 VITALS
HEART RATE: 82 BPM | WEIGHT: 275 LBS | TEMPERATURE: 97.7 F | DIASTOLIC BLOOD PRESSURE: 82 MMHG | SYSTOLIC BLOOD PRESSURE: 128 MMHG | RESPIRATION RATE: 16 BRPM | BODY MASS INDEX: 37.25 KG/M2 | OXYGEN SATURATION: 99 % | HEIGHT: 72 IN

## 2023-05-12 DIAGNOSIS — E66.9 OBESITY (BMI 30-39.9): ICD-10-CM

## 2023-05-12 DIAGNOSIS — F32.A ANXIETY AND DEPRESSION: ICD-10-CM

## 2023-05-12 DIAGNOSIS — D68.51 FACTOR V LEIDEN (CMS/HCC): ICD-10-CM

## 2023-05-12 DIAGNOSIS — F41.9 ANXIETY AND DEPRESSION: ICD-10-CM

## 2023-05-12 DIAGNOSIS — Z00.00 ROUTINE PHYSICAL EXAMINATION: Primary | ICD-10-CM

## 2023-05-12 PROBLEM — M16.0 PRIMARY OSTEOARTHRITIS OF BOTH HIPS: Status: RESOLVED | Noted: 2018-12-10 | Resolved: 2023-05-12

## 2023-05-12 PROBLEM — J18.9 PNEUMONIA OF RIGHT UPPER LOBE DUE TO INFECTIOUS ORGANISM: Status: RESOLVED | Noted: 2023-03-16 | Resolved: 2023-05-12

## 2023-05-12 PROCEDURE — 99396 PREV VISIT EST AGE 40-64: CPT | Performed by: FAMILY MEDICINE

## 2023-05-12 PROCEDURE — 3008F BODY MASS INDEX DOCD: CPT | Performed by: FAMILY MEDICINE

## 2023-05-12 RX ORDER — ALPRAZOLAM 0.5 MG/1
0.5 TABLET ORAL DAILY PRN
Qty: 30 TABLET | Refills: 0 | Status: SHIPPED | OUTPATIENT
Start: 2023-05-12 | End: 2023-11-01 | Stop reason: SDUPTHER

## 2023-05-12 ASSESSMENT — ENCOUNTER SYMPTOMS
NERVOUS/ANXIOUS: 0
ARTHRALGIAS: 0
CONSTIPATION: 0
COUGH: 0
DIZZINESS: 0
HEADACHES: 0
FREQUENCY: 0
SORE THROAT: 0
ABDOMINAL PAIN: 0
RHINORRHEA: 0
HEMATURIA: 0
SLEEP DISTURBANCE: 1
DYSURIA: 0
MYALGIAS: 0
FATIGUE: 0
WHEEZING: 0
CHILLS: 0
FEVER: 0
SHORTNESS OF BREATH: 0
PALPITATIONS: 0
BLOOD IN STOOL: 0

## 2023-05-12 ASSESSMENT — PATIENT HEALTH QUESTIONNAIRE - PHQ9: SUM OF ALL RESPONSES TO PHQ9 QUESTIONS 1 & 2: 0

## 2023-06-21 ENCOUNTER — TELEPHONE (OUTPATIENT)
Dept: PRIMARY CARE | Facility: CLINIC | Age: 53
End: 2023-06-21
Payer: COMMERCIAL

## 2023-06-21 DIAGNOSIS — E78.2 MIXED HYPERLIPIDEMIA: Primary | ICD-10-CM

## 2023-06-22 NOTE — TELEPHONE ENCOUNTER
Please let her know got copy of her labs from specialist showing her cholesterol is elevated 269 goal <200, TGs 169 goal <150, HDL 58 goal >50 and  goal <120 so I do think she needs to think about doing an updated calcium score and see how aggressive we need to be with the cholesterol so let me know her thoughts

## 2023-06-23 NOTE — TELEPHONE ENCOUNTER
Spoke with Bailey, she is aware of her lab results. Patient is interested in completing CT Calcium Score. Discussed the test with her. She is also aware of the out of pocket cost of $140. Order placed for her to have completed. Information sent via portal message on scheduling appointment.

## 2023-07-14 ENCOUNTER — HOSPITAL ENCOUNTER (OUTPATIENT)
Dept: RADIOLOGY | Age: 53
Discharge: HOME | End: 2023-07-14
Attending: FAMILY MEDICINE

## 2023-07-14 DIAGNOSIS — E78.2 MIXED HYPERLIPIDEMIA: ICD-10-CM

## 2023-07-14 PROCEDURE — 75571 CT HRT W/O DYE W/CA TEST: CPT | Mod: MG

## 2023-11-01 DIAGNOSIS — F41.9 ANXIETY AND DEPRESSION: ICD-10-CM

## 2023-11-01 DIAGNOSIS — F32.A ANXIETY AND DEPRESSION: ICD-10-CM

## 2023-11-01 RX ORDER — VALACYCLOVIR HYDROCHLORIDE 1 G/1
TABLET, FILM COATED ORAL
Qty: 30 TABLET | Refills: 0 | Status: SHIPPED | OUTPATIENT
Start: 2023-11-01

## 2023-11-01 RX ORDER — ALPRAZOLAM 0.5 MG/1
0.5 TABLET ORAL DAILY PRN
Qty: 30 TABLET | Refills: 0 | Status: SHIPPED | OUTPATIENT
Start: 2023-11-01 | End: 2024-05-20 | Stop reason: SDUPTHER

## 2023-11-01 RX ORDER — VALACYCLOVIR HYDROCHLORIDE 1 G/1
1000 TABLET, FILM COATED ORAL 3 TIMES DAILY PRN
Qty: 30 TABLET | Refills: 0 | Status: SHIPPED | OUTPATIENT
Start: 2023-11-01 | End: 2024-05-15 | Stop reason: ALTCHOICE

## 2023-11-01 NOTE — TELEPHONE ENCOUNTER
Medicine Refill Request    Last Office Visit: 5/12/2023  Next Office Visit: 5/15/2024        Current Outpatient Medications:     ALPRAZolam (XANAX) 0.5 mg tablet, Take 1 tablet (0.5 mg total) by mouth daily as needed for anxiety., Disp: 30 tablet, Rfl: 0    metFORMIN XR (GLUCOPHAGE-XR) 500 mg 24 hr tablet, TAKE 1 TABLET BY MOUTH IN THE EVENING WITH FOOD, Disp: , Rfl:     valACYclovir (VALTREX) 1 gram tablet, TAKE 1 TABLET BY MOUTH 3 TIMES A DAY AS NEEDED (COLD SORES) FOR UP TO 7 DAYS, Disp: 30 tablet, Rfl: 0      BP Readings from Last 3 Encounters:   05/12/23 128/82   03/20/23 130/82   03/16/23 128/80       Recent Lab results:  Lab Results   Component Value Date    CHOL 268 (H) 12/20/2018   ,   Lab Results   Component Value Date    HDL 52 (L) 12/20/2018   ,   Lab Results   Component Value Date    LDLCALC 177 (H) 12/20/2018   ,   Lab Results   Component Value Date    TRIG 194 (H) 12/20/2018        Lab Results   Component Value Date    GLUCOSE 100 (H) 12/20/2018   ,   Lab Results   Component Value Date    HGBA1C 5.0 07/15/2016         Lab Results   Component Value Date    CREATININE 0.7 12/20/2018       Lab Results   Component Value Date    TSH 1.62 12/20/2018

## 2024-05-15 ENCOUNTER — HOSPITAL ENCOUNTER (OUTPATIENT)
Dept: RADIOLOGY | Age: 54
Discharge: HOME | End: 2024-05-15
Attending: FAMILY MEDICINE
Payer: COMMERCIAL

## 2024-05-15 ENCOUNTER — OFFICE VISIT (OUTPATIENT)
Dept: PRIMARY CARE | Facility: CLINIC | Age: 54
End: 2024-05-15
Payer: COMMERCIAL

## 2024-05-15 VITALS
OXYGEN SATURATION: 98 % | TEMPERATURE: 97.6 F | HEART RATE: 76 BPM | HEIGHT: 72 IN | SYSTOLIC BLOOD PRESSURE: 110 MMHG | DIASTOLIC BLOOD PRESSURE: 80 MMHG | WEIGHT: 242 LBS | RESPIRATION RATE: 16 BRPM | BODY MASS INDEX: 32.78 KG/M2

## 2024-05-15 DIAGNOSIS — E66.09 CLASS 1 OBESITY DUE TO EXCESS CALORIES WITH SERIOUS COMORBIDITY AND BODY MASS INDEX (BMI) OF 32.0 TO 32.9 IN ADULT: ICD-10-CM

## 2024-05-15 DIAGNOSIS — Z00.00 ROUTINE PHYSICAL EXAMINATION: Primary | ICD-10-CM

## 2024-05-15 DIAGNOSIS — E66.811 CLASS 1 OBESITY DUE TO EXCESS CALORIES WITH SERIOUS COMORBIDITY AND BODY MASS INDEX (BMI) OF 32.0 TO 32.9 IN ADULT: ICD-10-CM

## 2024-05-15 DIAGNOSIS — E78.2 MIXED HYPERLIPIDEMIA: ICD-10-CM

## 2024-05-15 DIAGNOSIS — D68.51 FACTOR V LEIDEN (CMS/HCC): ICD-10-CM

## 2024-05-15 DIAGNOSIS — F41.9 ANXIETY: ICD-10-CM

## 2024-05-15 DIAGNOSIS — Z12.31 ENCOUNTER FOR SCREENING MAMMOGRAM FOR BREAST CANCER: ICD-10-CM

## 2024-05-15 PROBLEM — E66.9 OBESITY (BMI 30-39.9): Status: RESOLVED | Noted: 2023-05-12 | Resolved: 2024-05-15

## 2024-05-15 PROCEDURE — 3008F BODY MASS INDEX DOCD: CPT | Performed by: FAMILY MEDICINE

## 2024-05-15 PROCEDURE — 77067 SCR MAMMO BI INCL CAD: CPT

## 2024-05-15 PROCEDURE — 99396 PREV VISIT EST AGE 40-64: CPT | Performed by: FAMILY MEDICINE

## 2024-05-15 RX ORDER — MAGNESIUM 200 MG
TABLET ORAL
COMMUNITY

## 2024-05-15 RX ORDER — TIRZEPATIDE 7.5 MG/.5ML
7.5 INJECTION, SOLUTION SUBCUTANEOUS
Qty: 2 ML | Refills: 0 | Status: SHIPPED | OUTPATIENT
Start: 2024-05-15 | End: 2024-05-21

## 2024-05-15 RX ORDER — SAME BUTANEDISULFONATE/BETAINE 400-600 MG
5000 POWDER IN PACKET (EA) ORAL DAILY
COMMUNITY

## 2024-05-15 ASSESSMENT — ENCOUNTER SYMPTOMS
FEVER: 0
CHILLS: 0
MYALGIAS: 0
HEMATURIA: 0
DIZZINESS: 0
DYSURIA: 0
COUGH: 0
HEADACHES: 0
RHINORRHEA: 0
ARTHRALGIAS: 0
PALPITATIONS: 0
ABDOMINAL PAIN: 0
SORE THROAT: 0
BLOOD IN STOOL: 0
CONSTIPATION: 0
FATIGUE: 0
WHEEZING: 0
NERVOUS/ANXIOUS: 0
SHORTNESS OF BREATH: 0
SLEEP DISTURBANCE: 0
FREQUENCY: 0

## 2024-05-15 ASSESSMENT — PATIENT HEALTH QUESTIONNAIRE - PHQ9
SUM OF ALL RESPONSES TO PHQ QUESTIONS 1-9: 2
SUM OF ALL RESPONSES TO PHQ9 QUESTIONS 1 & 2: 2

## 2024-05-15 NOTE — PROGRESS NOTES
Subjective      Patient ID: Bailey Ordonez is a 53 y.o. female.    She is here for physical. Pt has been doing pretty good overall. She has been doing ok overall. She has been working with physician group since Oct and has her on metformin 500mg and then mounjaro 5mg and has lost about 40lbs so far and does need to increase the dose but is paying out of pocket for it and would like it to go through insurance. She did have labs done for specialty doctor as well. She is trying to follow well balanced meals with intermittent fasting. She is using pelethon workouts 4x wk. Average 7hrs of sleep a night and is not the best sleeper. She has lost her job last wk as well. Normal BMS and urination. UTD with eye and dental exam. UTD with mammo but due for updated gyn exam. She is still having periods every few months as well. Due for updated colonoscopy. She is concerned about her inflamm of body and the weight.         The following have been reviewed and updated as appropriate in this visit:   Tobacco  Allergies  Meds  Problems  Med Hx  Surg Hx  Fam Hx       Review of Systems   Constitutional:  Negative for chills, fatigue and fever.        +weight gain   HENT:  Negative for ear discharge, ear pain, postnasal drip, rhinorrhea and sore throat.    Respiratory:  Negative for cough, shortness of breath and wheezing.    Cardiovascular:  Negative for chest pain and palpitations.   Gastrointestinal:  Negative for abdominal pain, blood in stool and constipation.   Genitourinary:  Negative for dysuria, frequency and hematuria.   Musculoskeletal:  Negative for arthralgias and myalgias.   Skin:  Negative for rash.   Neurological:  Negative for dizziness and headaches.   Psychiatric/Behavioral:  Negative for sleep disturbance and suicidal ideas. The patient is not nervous/anxious.        Current Outpatient Medications   Medication Sig Dispense Refill    ALPRAZolam (XANAX) 0.5 mg tablet Take 1 tablet (0.5 mg total) by mouth  daily as needed for anxiety. 30 tablet 0    cholecalciferol, vitD3,/vit K2 (vitamin D3-vitamin K2) 125-90 mcg capsule Take 5,000 Units by mouth daily. Taking 2 tablet daily      magnesium 200 mg tablet Take by mouth.      metFORMIN XR (GLUCOPHAGE-XR) 500 mg 24 hr tablet TAKE 1 TABLET BY MOUTH IN THE EVENING WITH FOOD      tirzepatide, weight loss, (ZEPBOUND) 7.5 mg/0.5 mL subcutaneous injection Inject 0.5 mL (7.5 mg total) under the skin every (seven) 7 days. 2 mL 0    valACYclovir (VALTREX) 1 gram tablet TAKE 1 TABLET BY MOUTH 3 TIMES A DAY AS NEEDED (COLD SORES) FOR UP TO 7 DAYS 30 tablet 0     No current facility-administered medications for this visit.     Past Medical History:   Diagnosis Date    Anxiety and depression     Factor V Leiden (CMS/HCC)     Osteoarthritis of both hips     Pneumonia 03/2023     Family History   Problem Relation Age of Onset    Heart disease Biological Mother     Hypertension Biological Mother     Atrial fibrillation Biological Mother     Heart failure Biological Mother     Asthma Biological Mother     Factor V Leiden deficiency Biological Mother     Pancreatic cancer Biological Father     Factor V Leiden deficiency Biological Brother      Past Surgical History:   Procedure Laterality Date    COLONOSCOPY  09/2016    KNEE SURGERY      TONSILLECTOMY      TOTAL HIP ARTHROPLASTY Bilateral     L 2019 and R 2021     Social History     Socioeconomic History    Marital status:      Spouse name: Not on file    Number of children: 3    Years of education: Not on file    Highest education level: Not on file   Occupational History    Occupation: School Admin   Tobacco Use    Smoking status: Never    Smokeless tobacco: Never   Vaping Use    Vaping Use: Never used   Substance and Sexual Activity    Alcohol use: Yes     Comment: social     Drug use: Never    Sexual activity: Yes   Other Topics Concern    Not on file   Social History Narrative    Not on file     Social Determinants of Health      Financial Resource Strain: Not on file   Food Insecurity: Not on file   Transportation Needs: Not on file   Physical Activity: Not on file   Stress: Not on file   Social Connections: Not on file   Intimate Partner Violence: Not on file   Housing Stability: Not on file     Allergies   Allergen Reactions    Prochlorperazine Other (see comments)     Seizure          Objective   Visit Vitals  /80   Pulse 76   Temp 36.4 °C (97.6 °F) (Temporal)   Resp 16   Ht 1.829 m (6')   Wt 110 kg (242 lb) Comment: without shoes   LMP 03/28/2024 (Exact Date)   SpO2 98%   BMI 32.82 kg/m²     Physical Exam  Vitals and nursing note reviewed.   Constitutional:       Appearance: Normal appearance.   HENT:      Right Ear: Tympanic membrane normal. There is no impacted cerumen.      Left Ear: Tympanic membrane normal. There is no impacted cerumen.      Nose: Nose normal. No rhinorrhea.      Mouth/Throat:      Mouth: Mucous membranes are moist.      Pharynx: Oropharynx is clear. No posterior oropharyngeal erythema.   Cardiovascular:      Rate and Rhythm: Normal rate and regular rhythm.      Heart sounds: No murmur heard.  Pulmonary:      Effort: Pulmonary effort is normal.      Breath sounds: Normal breath sounds. No wheezing.   Abdominal:      General: Bowel sounds are normal.      Palpations: Abdomen is soft.      Tenderness: There is no abdominal tenderness.   Musculoskeletal:         General: Normal range of motion.      Cervical back: Normal range of motion and neck supple.   Skin:     General: Skin is warm.      Findings: No rash.   Neurological:      General: No focal deficit present.      Mental Status: She is alert and oriented to person, place, and time.      Cranial Nerves: No cranial nerve deficit.   Psychiatric:         Mood and Affect: Mood normal.         Behavior: Behavior normal.         Assessment/Plan   Problem List Items Addressed This Visit          Endocrine/Metabolic    Class 1 obesity due to excess calories  with serious comorbidity and body mass index (BMI) of 32.0 to 32.9 in adult    Relevant Medications    tirzepatide, weight loss, (ZEPBOUND) 7.5 mg/0.5 mL subcutaneous injection       Hematologic    Factor V Leiden (CMS/HCC)     Stable.             Mental Health    Anxiety       Other    Mixed hyperlipidemia     Other Visit Diagnoses       Routine physical examination    -  Primary        She is doing ok overall. We discussed cont with weight loss and diet and exercise. We will try to get her zepbound or wegovy but will start zepbound and go from there.  UTD with fasting labs at this time. UTD with mammo today. She will see annual gyn soon. She will set up new colonoscopy soon as well. UTD with eye and dental exam. UTD with immunizations. We will need to check weight in 3 months.    Layne Kramer, DO  5/15/2024

## 2024-05-20 DIAGNOSIS — F41.9 ANXIETY AND DEPRESSION: ICD-10-CM

## 2024-05-20 DIAGNOSIS — F32.A ANXIETY AND DEPRESSION: ICD-10-CM

## 2024-05-20 RX ORDER — ALPRAZOLAM 0.5 MG/1
0.5 TABLET ORAL DAILY PRN
Qty: 30 TABLET | Refills: 0 | Status: SHIPPED | OUTPATIENT
Start: 2024-05-20 | End: 2024-11-08

## 2024-05-20 NOTE — TELEPHONE ENCOUNTER
Alprazolam 0.5 mg   Filled on 11/1/2024   # 30/30  PDMP checked with no red flags  Medicine Refill Request    Last Office Visit: 5/15/2024   Last Consult Visit: 12/11/2020  Last Telemedicine Visit: Visit date not found    Next Appointment: 8/23/2024

## 2024-05-30 ENCOUNTER — TELEPHONE (OUTPATIENT)
Dept: PRIMARY CARE | Facility: CLINIC | Age: 54
End: 2024-05-30
Payer: COMMERCIAL

## 2024-05-30 NOTE — TELEPHONE ENCOUNTER
Pt's pharmacy called about pt's zepbound medication. Pharmacy stated that a prior auth is needed for this medication. Pharmacy rep tried to send a authorization request, but it [facility] system stated that request was not received by the Primary Care office.     Rep just wanted to make sure that a prior auth was started for this medication. Please advise.

## 2024-08-28 ENCOUNTER — OFFICE VISIT (OUTPATIENT)
Dept: PRIMARY CARE | Facility: CLINIC | Age: 54
End: 2024-08-28
Payer: COMMERCIAL

## 2024-08-28 VITALS
OXYGEN SATURATION: 97 % | HEART RATE: 77 BPM | HEIGHT: 72 IN | WEIGHT: 243 LBS | BODY MASS INDEX: 32.91 KG/M2 | TEMPERATURE: 97.7 F | RESPIRATION RATE: 16 BRPM | DIASTOLIC BLOOD PRESSURE: 80 MMHG | SYSTOLIC BLOOD PRESSURE: 118 MMHG

## 2024-08-28 DIAGNOSIS — E66.811 CLASS 1 OBESITY DUE TO EXCESS CALORIES WITH SERIOUS COMORBIDITY AND BODY MASS INDEX (BMI) OF 32.0 TO 32.9 IN ADULT: Primary | ICD-10-CM

## 2024-08-28 DIAGNOSIS — E78.2 MIXED HYPERLIPIDEMIA: ICD-10-CM

## 2024-08-28 DIAGNOSIS — D68.51 FACTOR V LEIDEN (CMS/HCC): ICD-10-CM

## 2024-08-28 DIAGNOSIS — E66.09 CLASS 1 OBESITY DUE TO EXCESS CALORIES WITH SERIOUS COMORBIDITY AND BODY MASS INDEX (BMI) OF 32.0 TO 32.9 IN ADULT: Primary | ICD-10-CM

## 2024-08-28 PROCEDURE — 99214 OFFICE O/P EST MOD 30 MIN: CPT | Performed by: FAMILY MEDICINE

## 2024-08-28 PROCEDURE — 3008F BODY MASS INDEX DOCD: CPT | Performed by: FAMILY MEDICINE

## 2024-08-28 RX ORDER — TIRZEPATIDE 10 MG/.5ML
10 INJECTION, SOLUTION SUBCUTANEOUS
Qty: 2 ML | Refills: 3
Start: 2024-08-28 | End: 2024-09-13

## 2024-08-28 ASSESSMENT — ENCOUNTER SYMPTOMS
FREQUENCY: 0
DYSURIA: 0
RHINORRHEA: 0
SORE THROAT: 0
CHILLS: 0
CONSTIPATION: 0
HEMATURIA: 0
NERVOUS/ANXIOUS: 0
MYALGIAS: 0
BLOOD IN STOOL: 0
DIZZINESS: 0
FEVER: 0
COUGH: 0
WHEEZING: 0
SLEEP DISTURBANCE: 0
SHORTNESS OF BREATH: 0
HEADACHES: 0
FATIGUE: 0
ARTHRALGIAS: 0
PALPITATIONS: 0
ABDOMINAL PAIN: 0

## 2024-08-28 NOTE — PROGRESS NOTES
Subjective      Patient ID: Bailey Ordonez is a 53 y.o. female.    She is here for weight check. She has been on the compound weight loss medication 5mg and did go up to 7.5mg for 1 month and now has the zepbound 10mg ose at home to start. She does know when the drug is not working she will get the food noise coming back at 7.5 mg and she will have wine and knows the dose needs to be increased. She does get a lapse in getting the 10mg for 2wks and so now is going to start it today. She feels it will be best to have the dose will refills and if needs to be increased she would let us know. She is trying to eat well balanced meals and get her protein in her diet. Stays well hydrated. She is walking and biking and is planning to do orange theory weekly. She also has weighted bands so will do strength training.       The following have been reviewed and updated as appropriate in this visit:   Tobacco  Allergies  Meds  Problems  Med Hx  Surg Hx  Fam Hx       Review of Systems   Constitutional:  Negative for chills, fatigue and fever.   HENT:  Negative for ear discharge, ear pain, postnasal drip, rhinorrhea and sore throat.    Respiratory:  Negative for cough, shortness of breath and wheezing.    Cardiovascular:  Negative for chest pain and palpitations.   Gastrointestinal:  Negative for abdominal pain, blood in stool and constipation.   Genitourinary:  Negative for dysuria, frequency and hematuria.   Musculoskeletal:  Negative for arthralgias and myalgias.   Skin:  Negative for rash.   Neurological:  Negative for dizziness and headaches.   Psychiatric/Behavioral:  Negative for sleep disturbance and suicidal ideas. The patient is not nervous/anxious.        Current Outpatient Medications   Medication Sig Dispense Refill    ALPRAZolam (XANAX) 0.5 mg tablet Take 1 tablet (0.5 mg total) by mouth daily as needed for anxiety. 30 tablet 0    cholecalciferol, vitD3,/vit K2 (vitamin D3-vitamin K2) 125-90 mcg capsule  Take 5,000 Units by mouth daily. Taking 2 tablet daily      magnesium 200 mg tablet Take by mouth.      metFORMIN XR (GLUCOPHAGE-XR) 500 mg 24 hr tablet TAKE 1 TABLET BY MOUTH IN THE EVENING WITH FOOD      tirzepatide, weight loss, (ZEPBOUND) 10 mg/0.5 mL subcutaneous injection Inject 0.5 mL (10 mg total) under the skin every (seven) 7 days. 2 mL 3    valACYclovir (VALTREX) 1 gram tablet TAKE 1 TABLET BY MOUTH 3 TIMES A DAY AS NEEDED (COLD SORES) FOR UP TO 7 DAYS 30 tablet 0     No current facility-administered medications for this visit.     Past Medical History:   Diagnosis Date    Anxiety and depression     Factor V Leiden (CMS/HCC)     Osteoarthritis of both hips     Pneumonia 03/2023     Family History   Problem Relation Age of Onset    Heart disease Biological Mother     Hypertension Biological Mother     Atrial fibrillation Biological Mother     Heart failure Biological Mother     Asthma Biological Mother     Factor V Leiden deficiency Biological Mother     Pancreatic cancer Biological Father     Factor V Leiden deficiency Biological Brother      Past Surgical History:   Procedure Laterality Date    COLONOSCOPY  09/2016    KNEE SURGERY      TONSILLECTOMY      TOTAL HIP ARTHROPLASTY Bilateral     L 2019 and R 2021     Social History     Socioeconomic History    Marital status:      Spouse name: Not on file    Number of children: 3    Years of education: Not on file    Highest education level: Not on file   Occupational History    Occupation: School Admin   Tobacco Use    Smoking status: Never    Smokeless tobacco: Never   Vaping Use    Vaping Use: Never used   Substance and Sexual Activity    Alcohol use: Yes     Comment: social     Drug use: Never    Sexual activity: Yes   Other Topics Concern    Not on file   Social History Narrative    Not on file     Social Determinants of Health     Financial Resource Strain: Not on file   Food Insecurity: Not on file   Transportation Needs: Not on file   Physical  Activity: Not on file   Stress: Not on file   Social Connections: Not on file   Intimate Partner Violence: Not on file   Housing Stability: Not on file     Allergies   Allergen Reactions    Prochlorperazine Other (see comments)     Seizure          Objective   Visit Vitals  /80 (BP Location: Left upper arm, Patient Position: Sitting)   Pulse 77   Temp 36.5 °C (97.7 °F) (Temporal)   Resp 16   Ht 1.829 m (6')   Wt 110 kg (243 lb) Comment: without shoes   SpO2 97%   BMI 32.96 kg/m²     Physical Exam  Vitals and nursing note reviewed.   Constitutional:       Appearance: Normal appearance.   Cardiovascular:      Rate and Rhythm: Normal rate and regular rhythm.      Heart sounds: No murmur heard.  Pulmonary:      Effort: Pulmonary effort is normal.      Breath sounds: Normal breath sounds. No wheezing.   Abdominal:      General: Bowel sounds are normal.      Palpations: Abdomen is soft.      Tenderness: There is no abdominal tenderness.   Musculoskeletal:         General: Normal range of motion.      Cervical back: Normal range of motion and neck supple.   Skin:     General: Skin is warm.      Findings: No rash.   Neurological:      General: No focal deficit present.      Mental Status: She is alert and oriented to person, place, and time.      Cranial Nerves: No cranial nerve deficit.   Psychiatric:         Mood and Affect: Mood normal.         Behavior: Behavior normal.       Assessment/Plan   Problem List Items Addressed This Visit          Endocrine/Metabolic    Class 1 obesity due to excess calories with serious comorbidity and body mass index (BMI) of 32.0 to 32.9 in adult - Primary    Relevant Medications    tirzepatide, weight loss, (ZEPBOUND) 10 mg/0.5 mL subcutaneous injection       Hematologic    Factor V Leiden (CMS/HCC)     Stable at this time.             Other    Mixed hyperlipidemia    Relevant Medications    tirzepatide, weight loss, (ZEPBOUND) 10 mg/0.5 mL subcutaneous injection   She is going to  continue on the zepbound 10mg which she is starting today and give it time and will give refills on it so she will not be interrupted and she will update me when ready to go to 12.5mg. She will cont with diet and exercise. Will check her in 3-4 months. She knows to keep me updated on everything.     Layne Kramer, DO  8/28/2024

## 2024-09-13 DIAGNOSIS — E66.09 CLASS 1 OBESITY DUE TO EXCESS CALORIES WITH SERIOUS COMORBIDITY AND BODY MASS INDEX (BMI) OF 32.0 TO 32.9 IN ADULT: ICD-10-CM

## 2024-09-13 DIAGNOSIS — E78.2 MIXED HYPERLIPIDEMIA: ICD-10-CM

## 2024-09-13 DIAGNOSIS — E66.811 CLASS 1 OBESITY DUE TO EXCESS CALORIES WITH SERIOUS COMORBIDITY AND BODY MASS INDEX (BMI) OF 32.0 TO 32.9 IN ADULT: ICD-10-CM

## 2024-09-13 RX ORDER — TIRZEPATIDE 10 MG/.5ML
10 INJECTION, SOLUTION SUBCUTANEOUS
Qty: 2 ML | Refills: 3 | Status: SHIPPED | OUTPATIENT
Start: 2024-09-13 | End: 2025-01-08 | Stop reason: SDUPTHER

## 2024-11-08 DIAGNOSIS — F32.A ANXIETY AND DEPRESSION: ICD-10-CM

## 2024-11-08 DIAGNOSIS — F41.9 ANXIETY AND DEPRESSION: ICD-10-CM

## 2024-11-08 RX ORDER — ALPRAZOLAM 0.5 MG/1
0.5 TABLET ORAL DAILY PRN
Qty: 30 TABLET | Refills: 0 | Status: SHIPPED | OUTPATIENT
Start: 2024-11-08

## 2024-11-08 NOTE — TELEPHONE ENCOUNTER
Medication ALPRAZolam (XANAX) 0.5 mg tablet   Filled on 5/20/2024  # 30  PDMP checked with no red flags     Medicine Refill Request    Last Office Visit: 8/28/2024   Last Consult Visit: 12/11/2020  Last Telemedicine Visit: Visit date not found    Next Appointment: 1/3/2025      Current Outpatient Medications:     ALPRAZolam (XANAX) 0.5 mg tablet, Take 1 tablet (0.5 mg total) by mouth daily as needed for anxiety., Disp: 30 tablet, Rfl: 0    cholecalciferol, vitD3,/vit K2 (vitamin D3-vitamin K2) 125-90 mcg capsule, Take 5,000 Units by mouth daily. Taking 2 tablet daily, Disp: , Rfl:     magnesium 200 mg tablet, Take by mouth., Disp: , Rfl:     metFORMIN XR (GLUCOPHAGE-XR) 500 mg 24 hr tablet, TAKE 1 TABLET BY MOUTH IN THE EVENING WITH FOOD, Disp: , Rfl:     tirzepatide, weight loss, (ZEPBOUND) 10 mg/0.5 mL subcutaneous PEN injector, Inject 0.5 mL (10 mg total) under the skin every (seven) 7 days., Disp: 2 mL, Rfl: 3    valACYclovir (VALTREX) 1 gram tablet, TAKE 1 TABLET BY MOUTH 3 TIMES A DAY AS NEEDED (COLD SORES) FOR UP TO 7 DAYS, Disp: 30 tablet, Rfl: 0      BP Readings from Last 3 Encounters:   08/28/24 118/80   05/15/24 110/80   05/12/23 128/82       Recent Lab results:  Lab Results   Component Value Date    CHOL 268 (H) 12/20/2018   ,   Lab Results   Component Value Date    HDL 52 (L) 12/20/2018   ,   Lab Results   Component Value Date    LDLCALC 177 (H) 12/20/2018   ,   Lab Results   Component Value Date    TRIG 194 (H) 12/20/2018        Lab Results   Component Value Date    GLUCOSE 100 (H) 12/20/2018   ,   Lab Results   Component Value Date    HGBA1C 5.0 07/15/2016         Lab Results   Component Value Date    CREATININE 0.7 12/20/2018       Lab Results   Component Value Date    TSH 1.62 12/20/2018           Lab Results   Component Value Date    HGBA1C 5.0 07/15/2016

## 2025-01-06 ENCOUNTER — APPOINTMENT (OUTPATIENT)
Dept: LAB | Facility: CLINIC | Age: 55
End: 2025-01-06
Payer: COMMERCIAL

## 2025-01-06 ENCOUNTER — OFFICE VISIT (OUTPATIENT)
Dept: PRIMARY CARE | Facility: CLINIC | Age: 55
End: 2025-01-06
Payer: COMMERCIAL

## 2025-01-06 VITALS
DIASTOLIC BLOOD PRESSURE: 70 MMHG | SYSTOLIC BLOOD PRESSURE: 124 MMHG | OXYGEN SATURATION: 99 % | RESPIRATION RATE: 18 BRPM | HEART RATE: 66 BPM | BODY MASS INDEX: 32.75 KG/M2 | WEIGHT: 241.8 LBS | TEMPERATURE: 98.1 F | HEIGHT: 72 IN

## 2025-01-06 DIAGNOSIS — E78.2 MIXED HYPERLIPIDEMIA: ICD-10-CM

## 2025-01-06 DIAGNOSIS — E66.811 CLASS 1 OBESITY DUE TO EXCESS CALORIES WITH SERIOUS COMORBIDITY AND BODY MASS INDEX (BMI) OF 32.0 TO 32.9 IN ADULT: ICD-10-CM

## 2025-01-06 DIAGNOSIS — E66.09 CLASS 1 OBESITY DUE TO EXCESS CALORIES WITH SERIOUS COMORBIDITY AND BODY MASS INDEX (BMI) OF 32.0 TO 32.9 IN ADULT: ICD-10-CM

## 2025-01-06 DIAGNOSIS — N92.6 MENSTRUATION, ABNORMAL: ICD-10-CM

## 2025-01-06 DIAGNOSIS — D68.51 FACTOR V LEIDEN (CMS/HCC): ICD-10-CM

## 2025-01-06 DIAGNOSIS — E53.8 B12 DEFICIENCY: ICD-10-CM

## 2025-01-06 DIAGNOSIS — Z76.89 ENCOUNTER FOR WEIGHT MANAGEMENT: Primary | ICD-10-CM

## 2025-01-06 DIAGNOSIS — E55.9 VITAMIN D DEFICIENCY: ICD-10-CM

## 2025-01-06 LAB
25(OH)D3 SERPL-MCNC: 27 NG/ML (ref 30–100)
ALBUMIN SERPL-MCNC: 4.6 G/DL (ref 3.5–5.7)
ALP SERPL-CCNC: 54 IU/L (ref 34–125)
ALT SERPL-CCNC: 23 IU/L (ref 7–52)
ANION GAP SERPL CALC-SCNC: 5 MEQ/L (ref 3–15)
AST SERPL-CCNC: 14 IU/L (ref 13–39)
BASOPHILS # BLD: 0.05 K/UL (ref 0.01–0.1)
BASOPHILS NFR BLD: 1 %
BILIRUB SERPL-MCNC: 0.8 MG/DL (ref 0.3–1.2)
BUN SERPL-MCNC: 15 MG/DL (ref 7–25)
CALCIUM SERPL-MCNC: 9.8 MG/DL (ref 8.6–10.3)
CHLORIDE SERPL-SCNC: 102 MEQ/L (ref 98–107)
CHOLEST SERPL-MCNC: 320 MG/DL
CO2 SERPL-SCNC: 33 MEQ/L (ref 21–31)
CREAT SERPL-MCNC: 0.8 MG/DL (ref 0.6–1.2)
DIFFERENTIAL METHOD BLD: ABNORMAL
EGFRCR SERPLBLD CKD-EPI 2021: >60 ML/MIN/1.73M*2
EOSINOPHIL # BLD: 0.13 K/UL (ref 0.04–0.36)
EOSINOPHIL NFR BLD: 2.5 %
ERYTHROCYTE [DISTWIDTH] IN BLOOD BY AUTOMATED COUNT: 12.5 % (ref 11.7–14.4)
EST. AVERAGE GLUCOSE BLD GHB EST-MCNC: 88 MG/DL
FERRITIN SERPL-MCNC: 34 NG/ML (ref 11–250)
GLUCOSE SERPL-MCNC: 104 MG/DL (ref 70–99)
HBA1C MFR BLD: 4.7 %
HCT VFR BLD AUTO: 46.6 % (ref 35–45)
HDLC SERPL-MCNC: 64 MG/DL
HDLC SERPL: 5 {RATIO}
HGB BLD-MCNC: 15.3 G/DL (ref 11.8–15.7)
IMM GRANULOCYTES # BLD AUTO: 0.01 K/UL (ref 0–0.08)
IMM GRANULOCYTES NFR BLD AUTO: 0.2 %
IRON SATN MFR SERPL: 37 % (ref 15–45)
IRON SERPL-MCNC: 177 UG/DL (ref 35–150)
LDLC SERPL CALC-MCNC: 190 MG/DL
LYMPHOCYTES # BLD: 2.34 K/UL (ref 1.2–3.5)
LYMPHOCYTES NFR BLD: 45.4 %
MCH RBC QN AUTO: 33.8 PG (ref 28–33.2)
MCHC RBC AUTO-ENTMCNC: 32.8 G/DL (ref 32.2–35.5)
MCV RBC AUTO: 102.9 FL (ref 83–98)
MONOCYTES # BLD: 0.28 K/UL (ref 0.28–0.8)
MONOCYTES NFR BLD: 5.4 %
NEUTROPHILS # BLD: 2.34 K/UL (ref 1.7–7)
NEUTS SEG NFR BLD: 45.5 %
NONHDLC SERPL-MCNC: 256 MG/DL
NRBC BLD-RTO: 0 %
PLATELET # BLD AUTO: 233 K/UL (ref 150–369)
PMV BLD AUTO: 10.7 FL (ref 9.4–12.3)
POTASSIUM SERPL-SCNC: 5.1 MEQ/L (ref 3.5–5.1)
PROT SERPL-MCNC: 7.3 G/DL (ref 6–8.2)
RBC # BLD AUTO: 4.53 M/UL (ref 3.93–5.22)
SODIUM SERPL-SCNC: 140 MEQ/L (ref 136–145)
TIBC SERPL-MCNC: 479 UG/DL (ref 270–460)
TRIGL SERPL-MCNC: 332 MG/DL
TSH SERPL DL<=0.05 MIU/L-ACNC: 2.11 MIU/L (ref 0.34–5.6)
UIBC SERPL-MCNC: 302 UG/DL (ref 180–360)
VIT B12 SERPL-MCNC: 162 PG/ML (ref 180–914)
WBC # BLD AUTO: 5.15 K/UL (ref 3.8–10.5)

## 2025-01-06 PROCEDURE — 82306 VITAMIN D 25 HYDROXY: CPT

## 2025-01-06 PROCEDURE — 82607 VITAMIN B-12: CPT

## 2025-01-06 PROCEDURE — 84443 ASSAY THYROID STIM HORMONE: CPT

## 2025-01-06 PROCEDURE — 80061 LIPID PANEL: CPT

## 2025-01-06 PROCEDURE — 83036 HEMOGLOBIN GLYCOSYLATED A1C: CPT

## 2025-01-06 PROCEDURE — 82040 ASSAY OF SERUM ALBUMIN: CPT

## 2025-01-06 PROCEDURE — 83540 ASSAY OF IRON: CPT

## 2025-01-06 PROCEDURE — 36415 COLL VENOUS BLD VENIPUNCTURE: CPT

## 2025-01-06 PROCEDURE — 82728 ASSAY OF FERRITIN: CPT

## 2025-01-06 PROCEDURE — 3008F BODY MASS INDEX DOCD: CPT

## 2025-01-06 PROCEDURE — 85025 COMPLETE CBC W/AUTO DIFF WBC: CPT

## 2025-01-06 PROCEDURE — 99214 OFFICE O/P EST MOD 30 MIN: CPT

## 2025-01-06 RX ORDER — ESTRADIOL 0.03 MG/D
FILM, EXTENDED RELEASE TRANSDERMAL
COMMUNITY
Start: 2024-10-20

## 2025-01-06 ASSESSMENT — ENCOUNTER SYMPTOMS
HEADACHES: 0
WOUND: 0
WHEEZING: 0
SHORTNESS OF BREATH: 0
COUGH: 0
APPETITE CHANGE: 1
ABDOMINAL PAIN: 0
TROUBLE SWALLOWING: 0
SORE THROAT: 0
CONSTIPATION: 1
FEVER: 0
FATIGUE: 0
PALPITATIONS: 0
CHILLS: 0
LIGHT-HEADEDNESS: 0
DIFFICULTY URINATING: 0
SINUS PRESSURE: 0
VOMITING: 0
NAUSEA: 0
DIZZINESS: 0
DIARRHEA: 0
SLEEP DISTURBANCE: 0

## 2025-01-06 NOTE — PROGRESS NOTES
[facility]   [address], [address]  [place] [address]  Phone: [phone]  Fax:[phone]        Subjective      Patient ID: Bailey Ordonez is a 54 y.o. female.  1970      #Weight Check Visit  Insurance company denied recent dosage. Patient required a weight check before filling any further dosages.   Food chatter has returned. Has been feeling more hungry. Patient feels as though she is ready for a dose adjustment.   Patient reported weighing 230 lbs toward end of November.     Wt Readings from Last 3 Encounters:  01/06/25 : 110 kg (241 lb 12.8 oz)  08/28/24 : 110 kg (243 lb)  05/15/24 : 110 kg (242 lb)     Patient takes a daily probiotic, metamucil, and senokot to manage constipation.   Regimen works well. Denies abdominal pain, nausea, vomiting, diarrhea.  Patient reported feeling overall well on the medication and has learned how to combat side effects.     Diet: usually well balanced but has recently been more hungry. Has increased portion sizes and diet could be better.   Exercise: could be better. Plans to restart exercise regimen and get back on track.       #Abnormal Menstrual Bleeding  Reported having abnormal periods. Saw a functional medicine specialist to have hormones check.   Is currently in menopause. Is concern that she is still having her periods. Will like further evaluation.   Denies abdominal or pelvic pain. Unusual odor, vaginal discharge, frequency, urgency.                 The following have been reviewed and updated as appropriate in this visit:   Tobacco  Allergies  Meds  Problems  Med Hx  Surg Hx  Fam Hx       Review of Systems   Constitutional:  Positive for appetite change. Negative for chills, fatigue and fever.   HENT:  Negative for hearing loss, sinus pressure, sore throat and trouble swallowing.    Eyes:  Negative for visual disturbance.   Respiratory:  Negative for cough, shortness of breath and wheezing.     Cardiovascular:  Negative for chest pain and palpitations.   Gastrointestinal:  Positive for constipation. Negative for abdominal pain, diarrhea, nausea and vomiting.   Genitourinary:  Positive for menstrual problem. Negative for difficulty urinating.   Skin:  Negative for rash and wound.   Neurological:  Negative for dizziness, light-headedness and headaches.   Psychiatric/Behavioral:  Negative for sleep disturbance.      Patient Active Problem List   Diagnosis    Anxiety    Factor V Leiden (CMS/[facility])    Class 1 obesity due to excess calories with serious comorbidity and body mass index (BMI) of 32.0 to 32.9 in adult    Mixed hyperlipidemia    Encounter for weight management    Menstruation, abnormal      Past Medical History:   Diagnosis Date    Anxiety and depression     Factor V Leiden (CMS/[facility])     Osteoarthritis of both hips     Pneumonia 03/2023     Past Surgical History   Procedure Laterality Date    Colonoscopy  09/2016    Knee surgery      Tonsillectomy      Total hip arthroplasty Bilateral     L 2019 and R 2021     Social History     Socioeconomic History    Marital status:      Spouse name: None    Number of children: 3    Years of education: None    Highest education level: None   Occupational History    Occupation: School Admin   Tobacco Use    Smoking status: Never    Smokeless tobacco: Never   Vaping Use    Vaping status: Never Used   Substance and Sexual Activity    Alcohol use: Yes     Comment: social     Drug use: Never    Sexual activity: Yes     Objective     Vitals:    01/06/25 1002   BP: 124/70   BP Location: Left upper arm   Patient Position: Sitting   Pulse: 66   Resp: 18   Temp: 36.7 °C (98.1 °F)   TempSrc: Temporal   SpO2: 99%   Weight: 110 kg (241 lb 12.8 oz)   Height: 1.829 m (6')     Body mass index is 32.79 kg/m².  Current Outpatient Medications   Medication Sig Dispense Refill    ALPRAZolam (XANAX) 0.5 mg tablet TAKE 1 TABLET BY MOUTH EVERY DAY AS NEEDED FOR ANXIETY 30 tablet  0    B.animalis,bifid,infantis,long (PROBIOTIC 4X ORAL) Probiotic      cholecalciferol, vitD3,/vit K2 (vitamin D3-vitamin K2) 125-90 mcg capsule Take 5,000 Units by mouth daily. Taking 2 tablet daily      estradioL (VIVELLE-DOT) 0.025 mg/24 hr semiweekly patch APPLY 1 PATCH TOPICALLY TO SKIN TWICE A WEEK      magnesium 200 mg tablet Take by mouth.      metFORMIN XR (GLUCOPHAGE-XR) 500 mg 24 hr tablet TAKE 1 TABLET BY MOUTH IN THE EVENING WITH FOOD      tirzepatide, weight loss, (ZEPBOUND) 10 mg/0.5 mL subcutaneous PEN injector Inject 0.5 mL (10 mg total) under the skin every (seven) 7 days. 2 mL 3    valACYclovir (VALTREX) 1 gram tablet TAKE 1 TABLET BY MOUTH 3 TIMES A DAY AS NEEDED (COLD SORES) FOR UP TO 7 DAYS 30 tablet 0    vitamin B complex (B COMPLEX 1 ORAL) B Complex       No current facility-administered medications for this visit.       Physical Exam  Vitals reviewed.   Constitutional:       General: She is awake. She is not in acute distress.     Appearance: She is not ill-appearing.   HENT:      Head: Normocephalic.      Right Ear: Hearing and tympanic membrane normal.      Left Ear: Hearing and tympanic membrane normal.      Nose: Nose normal. No nasal tenderness or congestion.      Right Turbinates: Not enlarged or swollen.      Left Turbinates: Not enlarged or swollen.      Mouth/Throat:      Lips: Pink.      Mouth: Mucous membranes are moist.      Pharynx: Oropharynx is clear. Uvula midline.      Tonsils: No tonsillar exudate.   Eyes:      General: Lids are normal.      Extraocular Movements: Extraocular movements intact.      Conjunctiva/sclera: Conjunctivae normal.      Pupils: Pupils are equal, round, and reactive to light. Pupils are equal.   Neck:      Thyroid: No thyroid tenderness.      Trachea: Trachea normal.   Cardiovascular:      Rate and Rhythm: Normal rate and regular rhythm. No extrasystoles are present.     Heart sounds: Normal heart sounds, S1 normal and S2 normal. No murmur heard.      No friction rub. No gallop.   Pulmonary:      Effort: Pulmonary effort is normal.      Breath sounds: Normal breath sounds.   Abdominal:      General: Bowel sounds are normal. There is no distension.      Palpations: Abdomen is soft.      Tenderness: There is no abdominal tenderness. There is no right CVA tenderness or left CVA tenderness.   Musculoskeletal:         General: Normal range of motion.      Cervical back: Full passive range of motion without pain.      Right lower leg: No edema.      Left lower leg: No edema.   Lymphadenopathy:      Cervical: No cervical adenopathy.   Skin:     General: Skin is warm.      Capillary Refill: Capillary refill takes less than 2 seconds.   Neurological:      Mental Status: She is alert and oriented to person, place, and time. Mental status is at baseline.   Psychiatric:         Mood and Affect: Mood normal.         Speech: Speech normal.         Behavior: Behavior normal. Behavior is cooperative.         Assessment/Plan     Diagnoses and all orders for this visit:    Encounter for weight management (Primary)  Assessment & Plan:  Currently on Zepbound 10 mg; meformin 500 mg daily  Patient tolerating med well. Is able to manage constipation. Having daily bowel movements.   Patient to obtain labs; once labs received will send script for a one month supply of Zepbound 10 mg.   Plan to increase dose to zepbound 12.5 after one month if patient tolerating medication well.     Discussed GLP-1 agonist medications. Pt has no pregnancy plans, no history of pancreatitis, and no personal or family history of medullary thyroid tumors. Successful weight loss with this medication begins at 5% body weight loss, but we can expect up to 20% body weight loss with this intervention.  These medications must be used in combination with proper nutrition, regular physical activity, and proper sleep and stress management.       Discussed most common side effects of nausea and constipation. Generally  nausea can be helped by having small frequent meals.     Labs and OV for weight check in 3 months      Discussed GLP1 is a life long commitment to maintain weight loss        Class 1 obesity due to excess calories with serious comorbidity and body mass index (BMI) of 32.0 to 32.9 in adult  Assessment & Plan:  On Zepbound 10 mg; will like to increase dose  Stable on Metformin 500 mg.   Patient to obtain labs.  Patient agreeable to one month supply of Zepbound 10 mg.   After one month, patient to request refill for Zepbound 12.5 mg if tolerating med well.   Patient to follow up for weight check in 3 months    Orders:  -     Comprehensive metabolic panel; Future  -     CBC and differential; Future  -     Hemoglobin A1c; Future  -     Lipid panel; Future  -     TSH w reflex FT4; Future    Mixed hyperlipidemia  -     Lipid panel; Future    B12 deficiency  -     Vitamin B12; Future    Vitamin D deficiency  -     Vitamin D 25 hydroxy; Future    Menstruation, abnormal  Assessment & Plan:  VSS, afebrile, non-hypoxic, NAD  Labs ordered to evaluate for anemia  Transabdominal/transvaginal ultrasound ordered  Patient mentioned having possible PCOS; follows with functional medicine for menopausal symptoms  Started on metformin 500mg; estadiol patch. Reported tolerating medication well.   Patient to establish with new gyn; recommended [facility] in [place]  Educated on red warning signs; when to follow up    Orders:  -     Iron and TIBC; Future  -     Ferritin; Future  -     US PELVIS TRANSABDOMINAL & TRANSVAGINAL; Future    Factor V Leiden (CMS/HCC)  Assessment & Plan:  Stable             JAIME Rowan   1/6/2025

## 2025-01-06 NOTE — ASSESSMENT & PLAN NOTE
Currently on Zepbound 10 mg; meformin 500 mg daily  Patient tolerating med well. Is able to manage constipation. Having daily bowel movements.   Patient to obtain labs; once labs received will send script for a one month supply of Zepbound 10 mg.   Plan to increase dose to zepbound 12.5 after one month if patient tolerating medication well.     Discussed GLP-1 agonist medications. Pt has no pregnancy plans, no history of pancreatitis, and no personal or family history of medullary thyroid tumors. Successful weight loss with this medication begins at 5% body weight loss, but we can expect up to 20% body weight loss with this intervention.  These medications must be used in combination with proper nutrition, regular physical activity, and proper sleep and stress management.       Discussed most common side effects of nausea and constipation. Generally nausea can be helped by having small frequent meals.     Labs and OV for weight check in 3 months      Discussed GLP1 is a life long commitment to maintain weight loss

## 2025-01-06 NOTE — PATIENT INSTRUCTIONS
Obtain your labs today.  Schedule your transabdominal/transvaginal ultrasound  3.  Make an appt with Dr. Hill for management of menopause symptoms.       WHAT YOU SHOULD KNOW ABOUT ZEPBOUND     Zepbound works in multiple ways. It helps the body release insulin when blood sugar is high, remove excess sugar from the blood, stop the liver from making and releasing too much sugar, reducing appetite and slowing down how quickly food leaves the stomach to improve satiety.      The dosing schedule of Zepbound is as below:    Month 1: 2.5 mg weekly injection  Month 2: 5 mg weekly injection  Month 3: 7.5mg weekly injection  Month 4: 10 mg weekly injection  Month 5: 12.5mg weekly injection  Month 6: 15mg weekly injection      We do not follow this exact dosing plan. Dose adjustments are determined whether or not you are still losing weight and after a thorough assessment. Dose adjustments require an in office visit.      For a video of how to perform the injection visit: https://www.[employer]/how-to-use-mounjaro#how-to-use     Zepbound comes in a single-dose pen that requires no mixing. There is no need to see or handle the needle. Zepbound must be stored in the refrigerator unless you are traveling. If you are traveling, please refrigerate it as soon as possible when you arrive at your destination. It does not need to be kept on ice when traveling, but please avoid exposing it to extreme heat or cold or direct sunlight.      Zepbound is injected under the skin of your abdomen, thigh, or upper arm. Do not inject into a muscle or vein. Change your injection site with each injection. Do not use the same site for each injection. If you choose to inject in the same area, always use a different spot in that area.     The most common side effects experienced by patients taking Zepbound include nausea, diarrhea, vomiting, constipation, indigestion, stomach pain.      Every persons' experience on Zepbound is different. Some  individuals do not need to increase to higher doses to see good benefit, some individuals have side effects at low or mid range doses and need to remain on these doses a few months before they increase to a higher dose.  When you need a new prescription, send   me a message the day you take your final dose telling me what dose you are currently taking, and how you are tolerating that dose in terms of appetite suppression or side effects, so I know which next dosage to send to your pharmacy.      In studies, most nausea, vomiting and diarrhea events occurred while the dose of Zepbound was being increased. These events decreased over time.     In people who have kidney problems, diarrhea, nausea, and vomiting may cause a loss of fluids (dehydration), which may cause kidney problems to get worse. It is important for you to drink fluids to help reduce your chance of dehydration.     If you develop nausea, you do not want to increase to the next higher dose until nausea has resolved.  Mild nausea is not a reason to stop the medication.  When you need a refill, please contact me to let me know how you are tolerating your current injection dose.  This will allow me  to determine if we increase your dose or repeat the same dose for another month.     If you develop reflux, please start taking a daily Prevacid or Omeprazole 20mg daily 30-60 minutes before first food in the morning and a nightly Pepcid 20mg with dinner or before bed. Both of these medications are over the counter. We will wean you off of both of these medications at a future point, but this should help control your side effects. If it does not, let me know.      If you develop constipation, increase your water intake to at least 64 ounces a day and start a daily a fiber supplement like Metamucil or FiberCon.  You can also use MiraLAX 1 capful daily or dulcosate (colace or dulcolax)  as a stool softener 1-2 tabs twice daily.  If you go 3 days without a bowel  movement, please use milk of magnesium 30 ml every 12 hours for 3 doses or until you have a bowel movement.      If you want to change the day of the week you take your dose, make sure there are at least 3 days (72 hours) between doses. If you miss a dose of Zepbound , take the missed dose as soon as possible within 4 days (96 hours) after the missed dose. If more than 4 days have passed, skip the missed dose and take your next dose on the regularly scheduled day. Do not take 2 doses of Zepbound within 3 days of each other.     Rare but serious side effects include:  You should not take this medication if you are pregnant, nursing, or plan to become pregnant in the next 3 months.  You should not take this medicine if you have history of severe kidney disease, pancreatitis or diabetic retinopathy.  If you develop severe abdominal pain while taking this medication, please discontinue the medication and contact our office as soon as possible.   In mouse models, this medication induced a rare form of thyroid tumor called a medullary thyroid tumor.  While we have not seen this happen in humans in the past 10 years of using this type of medication, anyone with a family history of this rare thyroid tumor, should not use this medication.  Please let us know if you develop any difficulty swallowing, or voice changes while on this medication as this could be a sign of a problem developing with your thyroid.        Nutrition:  With Zepbound it is best to eat 6 small meals a day. Increase protein intake and water intake to help combat negative side effects.   Eat a variety of healthy foods, including fruits and vegetables, whole grains, lean proteins, and low-fat dairy products.  Drink water instead of sugary drinks such as juice, soda, or sports drinks.  Drink enough fluid to keep your urine pale yellow.  Plan healthy, balanced meals. Work with a dietitian to make a healthy meal plan that works for you.  Limit the  following:  Foods that are high in fat, salt (sodium), or sugar. These include candy, donuts, pizza, and fast foods.  Fried or heavily processed foods.  Activity:  Include different types of exercise in your exercise program, such as strengthening, aerobic, and flexibility exercises.  When taking Zepbound, it is important to incorporate weight training into your exercise program to help prevent muscle loss.   To maintain your weight, get at least 150 minutes of moderate-intensity exercise each week. Moderate-intensity exercise could be brisk walking or biking.  To lose a healthy amount of weight, get 60 minutes of moderate-intensity exercise each day.  Find ways to reduce stress, such as regular exercise or meditation.  Find a hobby or other activity that you enjoy to distract you from eating when you feel stressed or bored.    Zepbound  and GI side effects:  You may experience GI side effects such as nausea, vomiting, acid reflux, constipation while taking Zepbound. To reduce side effects:   Consume smaller meals  Eat slowly  Do not lie down after eating  Avoid greasy, fried foods  Eat high fiber foods  Drink plenty of water  Exercise, take walks  Drink sukhjinder or peppermint tea.    Reach out to the office if these side effects are accompanied with upper or mid-abdominal pain or if you are unable to manage side effects.

## 2025-01-06 NOTE — ASSESSMENT & PLAN NOTE
VSS, afebrile, non-hypoxic, NAD  Labs ordered to evaluate for anemia  Transabdominal/transvaginal ultrasound ordered  Patient mentioned having possible PCOS; follows with functional medicine for menopausal symptoms  Started on metformin 500mg; estadiol patch. Reported tolerating medication well.   Patient to establish with new gyn; recommended [facility] in [place]  Educated on red warning signs; when to follow up

## 2025-01-06 NOTE — ASSESSMENT & PLAN NOTE
On Zepbound 10 mg; will like to increase dose  Stable on Metformin 500 mg.   Patient to obtain labs.  Patient agreeable to one month supply of Zepbound 10 mg.   After one month, patient to request refill for Zepbound 12.5 mg if tolerating med well.   Patient to follow up for weight check in 3 months

## 2025-01-08 ENCOUNTER — TELEPHONE (OUTPATIENT)
Dept: PRIMARY CARE | Facility: CLINIC | Age: 55
End: 2025-01-08
Payer: COMMERCIAL

## 2025-01-08 ENCOUNTER — HOSPITAL ENCOUNTER (OUTPATIENT)
Dept: RADIOLOGY | Facility: CLINIC | Age: 55
Discharge: HOME | End: 2025-01-08
Payer: COMMERCIAL

## 2025-01-08 DIAGNOSIS — E66.811 CLASS 1 OBESITY DUE TO EXCESS CALORIES WITH SERIOUS COMORBIDITY AND BODY MASS INDEX (BMI) OF 32.0 TO 32.9 IN ADULT: ICD-10-CM

## 2025-01-08 DIAGNOSIS — E55.9 VITAMIN D DEFICIENCY: ICD-10-CM

## 2025-01-08 DIAGNOSIS — Z00.00 ROUTINE PHYSICAL EXAMINATION: ICD-10-CM

## 2025-01-08 DIAGNOSIS — E66.09 CLASS 1 OBESITY DUE TO EXCESS CALORIES WITH SERIOUS COMORBIDITY AND BODY MASS INDEX (BMI) OF 32.0 TO 32.9 IN ADULT: ICD-10-CM

## 2025-01-08 DIAGNOSIS — E53.8 B12 DEFICIENCY: ICD-10-CM

## 2025-01-08 DIAGNOSIS — E78.2 MIXED HYPERLIPIDEMIA: ICD-10-CM

## 2025-01-08 DIAGNOSIS — D53.9 MACROCYTIC ANEMIA: Primary | ICD-10-CM

## 2025-01-08 DIAGNOSIS — N92.6 MENSTRUATION, ABNORMAL: ICD-10-CM

## 2025-01-08 PROCEDURE — 76830 TRANSVAGINAL US NON-OB: CPT

## 2025-01-08 PROCEDURE — 76856 US EXAM PELVIC COMPLETE: CPT

## 2025-01-08 RX ORDER — ROSUVASTATIN CALCIUM 5 MG/1
5 TABLET, COATED ORAL DAILY
Qty: 90 TABLET | Refills: 1 | Status: SHIPPED | OUTPATIENT
Start: 2025-01-08 | End: 2025-07-07

## 2025-01-08 RX ORDER — TIRZEPATIDE 10 MG/.5ML
10 INJECTION, SOLUTION SUBCUTANEOUS
Qty: 2 ML | Refills: 0 | Status: SHIPPED | OUTPATIENT
Start: 2025-01-08 | End: 2025-01-31 | Stop reason: DRUGHIGH

## 2025-01-08 NOTE — PROGRESS NOTES
Telephone encounter: Discussed lab results with patient and treatment plan.  Zepbound 10 mg ordered.  Patient started on Crestor 5 mg.

## 2025-01-08 NOTE — RESULT ENCOUNTER NOTE
Telephone encounter: Discussed patient's results and treatment plan.  Patient agreeable to treatment plan.  Sent recap of lab results, plan, education via portal.   Lipid panel: Patient started on Crestor 5 mg daily.  Restarted taking fish oil.  Provided education on diet and exercise.  B12 deficiency: Patient to restart taking B12 supplement.  Patient to restart monthly B12 injection.  B12 deficiency possible contributing factor to elevated iron, TIBC level, hematocrit, MCV, MCH levels.  Will recheck these levels in 3 months.  Vitamin D deficiency: Patient to restart taking vitamin D3 supplement 5000 IU daily.    Prescription for Zepbound 10 mg sent to pharmacy.

## 2025-01-10 NOTE — RESULT ENCOUNTER NOTE
Telephone encounter: Called patient and discussed results. There are cystic changes in the endometrium through the lower uterine segment. Patient to establish with a ob/gyn for further evaluation and management.

## 2025-01-14 ENCOUNTER — CLINICAL SUPPORT (OUTPATIENT)
Dept: PRIMARY CARE | Facility: CLINIC | Age: 55
End: 2025-01-14
Payer: COMMERCIAL

## 2025-01-14 DIAGNOSIS — E53.8 B12 DEFICIENCY: Primary | ICD-10-CM

## 2025-01-14 PROCEDURE — 96372 THER/PROPH/DIAG INJ SC/IM: CPT | Performed by: FAMILY MEDICINE

## 2025-01-14 RX ORDER — CYANOCOBALAMIN 1000 UG/ML
1000 INJECTION, SOLUTION INTRAMUSCULAR; SUBCUTANEOUS ONCE
Status: COMPLETED | OUTPATIENT
Start: 2025-01-14 | End: 2025-01-14

## 2025-01-14 RX ADMIN — CYANOCOBALAMIN 1000 MCG: 1000 INJECTION, SOLUTION INTRAMUSCULAR; SUBCUTANEOUS at 14:45

## 2025-01-23 DIAGNOSIS — E53.8 B12 DEFICIENCY: Primary | ICD-10-CM

## 2025-01-23 RX ORDER — CYANOCOBALAMIN 1000 UG/ML
1000 INJECTION, SOLUTION INTRAMUSCULAR; SUBCUTANEOUS ONCE
Status: CANCELLED | OUTPATIENT
Start: 2025-01-28 | End: 2025-01-28

## 2025-01-28 ENCOUNTER — CLINICAL SUPPORT (OUTPATIENT)
Dept: PRIMARY CARE | Facility: CLINIC | Age: 55
End: 2025-01-28
Payer: COMMERCIAL

## 2025-01-28 DIAGNOSIS — E53.8 B12 DEFICIENCY: Primary | ICD-10-CM

## 2025-01-28 PROCEDURE — 96372 THER/PROPH/DIAG INJ SC/IM: CPT | Performed by: FAMILY MEDICINE

## 2025-01-28 RX ORDER — CYANOCOBALAMIN 1000 UG/ML
1000 INJECTION, SOLUTION INTRAMUSCULAR; SUBCUTANEOUS ONCE
Status: COMPLETED | OUTPATIENT
Start: 2025-01-28 | End: 2025-01-28

## 2025-01-28 RX ADMIN — CYANOCOBALAMIN 1000 MCG: 1000 INJECTION, SOLUTION INTRAMUSCULAR; SUBCUTANEOUS at 13:19

## 2025-01-31 ENCOUNTER — TELEPHONE (OUTPATIENT)
Dept: PRIMARY CARE | Facility: CLINIC | Age: 55
End: 2025-01-31

## 2025-01-31 RX ORDER — TIRZEPATIDE 12.5 MG/.5ML
12.5 INJECTION, SOLUTION SUBCUTANEOUS
Qty: 2 ML | Refills: 0 | Status: SHIPPED | OUTPATIENT
Start: 2025-01-31 | End: 2025-02-25 | Stop reason: SDUPTHER

## 2025-02-25 DIAGNOSIS — E66.811 CLASS 1 OBESITY DUE TO EXCESS CALORIES WITH SERIOUS COMORBIDITY AND BODY MASS INDEX (BMI) OF 32.0 TO 32.9 IN ADULT: Primary | ICD-10-CM

## 2025-02-25 DIAGNOSIS — E66.09 CLASS 1 OBESITY DUE TO EXCESS CALORIES WITH SERIOUS COMORBIDITY AND BODY MASS INDEX (BMI) OF 32.0 TO 32.9 IN ADULT: Primary | ICD-10-CM

## 2025-02-27 VITALS — HEIGHT: 72 IN | WEIGHT: 231 LBS | BODY MASS INDEX: 31.29 KG/M2

## 2025-02-27 RX ORDER — TIRZEPATIDE 12.5 MG/.5ML
12.5 INJECTION, SOLUTION SUBCUTANEOUS
Qty: 2 ML | Refills: 0 | Status: SHIPPED | OUTPATIENT
Start: 2025-02-27

## 2025-03-18 ENCOUNTER — APPOINTMENT (OUTPATIENT)
Dept: LAB | Age: 55
End: 2025-03-18
Payer: COMMERCIAL

## 2025-03-18 DIAGNOSIS — Z01.84 IMMUNITY STATUS TESTING: ICD-10-CM

## 2025-03-18 DIAGNOSIS — E66.811 CLASS 1 OBESITY DUE TO EXCESS CALORIES WITH SERIOUS COMORBIDITY AND BODY MASS INDEX (BMI) OF 32.0 TO 32.9 IN ADULT: ICD-10-CM

## 2025-03-18 DIAGNOSIS — E66.09 CLASS 1 OBESITY DUE TO EXCESS CALORIES WITH SERIOUS COMORBIDITY AND BODY MASS INDEX (BMI) OF 32.0 TO 32.9 IN ADULT: ICD-10-CM

## 2025-03-18 DIAGNOSIS — E78.2 MIXED HYPERLIPIDEMIA: ICD-10-CM

## 2025-03-18 DIAGNOSIS — Z00.00 ROUTINE PHYSICAL EXAMINATION: ICD-10-CM

## 2025-03-18 DIAGNOSIS — E55.9 VITAMIN D DEFICIENCY: ICD-10-CM

## 2025-03-18 DIAGNOSIS — E53.8 B12 DEFICIENCY: ICD-10-CM

## 2025-03-18 DIAGNOSIS — D53.9 MACROCYTIC ANEMIA: ICD-10-CM

## 2025-03-18 LAB
25(OH)D3 SERPL-MCNC: 37 NG/ML (ref 30–100)
ALBUMIN SERPL-MCNC: 4.7 G/DL (ref 3.5–5.7)
ALP SERPL-CCNC: 54 IU/L (ref 34–125)
ALT SERPL-CCNC: 27 IU/L (ref 7–52)
ANION GAP SERPL CALC-SCNC: 7 MEQ/L (ref 3–15)
AST SERPL-CCNC: 19 IU/L (ref 13–39)
BASOPHILS # BLD: 0.02 K/UL (ref 0.01–0.1)
BASOPHILS NFR BLD: 0.4 %
BILIRUB SERPL-MCNC: 0.5 MG/DL (ref 0.3–1.2)
BUN SERPL-MCNC: 11 MG/DL (ref 7–25)
CALCIUM SERPL-MCNC: 9.8 MG/DL (ref 8.6–10.3)
CHLORIDE SERPL-SCNC: 105 MEQ/L (ref 98–107)
CHOLEST SERPL-MCNC: 158 MG/DL
CO2 SERPL-SCNC: 29 MEQ/L (ref 21–31)
CREAT SERPL-MCNC: 0.7 MG/DL (ref 0.6–1.2)
DIFFERENTIAL METHOD BLD: ABNORMAL
EGFRCR SERPLBLD CKD-EPI 2021: >60 ML/MIN/1.73M*2
EOSINOPHIL # BLD: 0.08 K/UL (ref 0.04–0.36)
EOSINOPHIL NFR BLD: 1.7 %
ERYTHROCYTE [DISTWIDTH] IN BLOOD BY AUTOMATED COUNT: 12.2 % (ref 11.7–14.4)
EST. AVERAGE GLUCOSE BLD GHB EST-MCNC: 88 MG/DL
FERRITIN SERPL-MCNC: 88 NG/ML (ref 11–250)
FOLATE SERPL-MCNC: >20 NG/ML
GLUCOSE SERPL-MCNC: 108 MG/DL (ref 70–99)
HBA1C MFR BLD: 4.7 %
HCT VFR BLD AUTO: 42.4 % (ref 35–45)
HCYS SERPL-SCNC: 7.3 UMOL/L (ref 5–15)
HDLC SERPL-MCNC: 61 MG/DL
HDLC SERPL: 2.6 {RATIO}
HGB BLD-MCNC: 14.5 G/DL (ref 11.8–15.7)
IMM GRANULOCYTES # BLD AUTO: 0.01 K/UL (ref 0–0.08)
IMM GRANULOCYTES NFR BLD AUTO: 0.2 %
IRON SATN MFR SERPL: 17 % (ref 15–45)
IRON SERPL-MCNC: 62 UG/DL (ref 35–150)
LDLC SERPL CALC-MCNC: 75 MG/DL
LYMPHOCYTES # BLD: 2.11 K/UL (ref 1.2–3.5)
LYMPHOCYTES NFR BLD: 43.7 %
MCH RBC QN AUTO: 34.4 PG (ref 28–33.2)
MCHC RBC AUTO-ENTMCNC: 34.2 G/DL (ref 32.2–35.5)
MCV RBC AUTO: 100.5 FL (ref 83–98)
MONOCYTES # BLD: 0.32 K/UL (ref 0.28–0.8)
MONOCYTES NFR BLD: 6.6 %
NEUTROPHILS # BLD: 2.29 K/UL (ref 1.7–7)
NEUTS SEG NFR BLD: 47.4 %
NONHDLC SERPL-MCNC: 97 MG/DL
NRBC BLD-RTO: 0 %
PLATELET # BLD AUTO: 240 K/UL (ref 150–369)
PMV BLD AUTO: 11.6 FL (ref 9.4–12.3)
POTASSIUM SERPL-SCNC: 4.9 MEQ/L (ref 3.5–5.1)
PROT SERPL-MCNC: 7.1 G/DL (ref 6–8.2)
RBC # BLD AUTO: 4.22 M/UL (ref 3.93–5.22)
RUBV IGG SER-ACNC: 8.9 IU/ML
SODIUM SERPL-SCNC: 141 MEQ/L (ref 136–145)
TIBC SERPL-MCNC: 358 UG/DL (ref 270–460)
TRIGL SERPL-MCNC: 112 MG/DL
TSH SERPL DL<=0.05 MIU/L-ACNC: 2.37 MIU/L (ref 0.34–5.6)
UIBC SERPL-MCNC: 296 UG/DL (ref 180–360)
VIT B12 SERPL-MCNC: 416 PG/ML (ref 180–914)
WBC # BLD AUTO: 4.83 K/UL (ref 3.8–10.5)

## 2025-03-18 PROCEDURE — 83921 ORGANIC ACID SINGLE QUANT: CPT

## 2025-03-18 PROCEDURE — 80061 LIPID PANEL: CPT

## 2025-03-18 PROCEDURE — 86765 RUBEOLA ANTIBODY: CPT

## 2025-03-18 PROCEDURE — 83036 HEMOGLOBIN GLYCOSYLATED A1C: CPT

## 2025-03-18 PROCEDURE — 86340 INTRINSIC FACTOR ANTIBODY: CPT

## 2025-03-18 PROCEDURE — 86735 MUMPS ANTIBODY: CPT

## 2025-03-18 PROCEDURE — 36415 COLL VENOUS BLD VENIPUNCTURE: CPT

## 2025-03-18 PROCEDURE — 83090 ASSAY OF HOMOCYSTEINE: CPT

## 2025-03-18 PROCEDURE — 80053 COMPREHEN METABOLIC PANEL: CPT

## 2025-03-18 PROCEDURE — 84443 ASSAY THYROID STIM HORMONE: CPT

## 2025-03-18 PROCEDURE — 86762 RUBELLA ANTIBODY: CPT

## 2025-03-18 PROCEDURE — 83540 ASSAY OF IRON: CPT

## 2025-03-18 PROCEDURE — 82728 ASSAY OF FERRITIN: CPT

## 2025-03-18 PROCEDURE — 85025 COMPLETE CBC W/AUTO DIFF WBC: CPT

## 2025-03-18 PROCEDURE — 82746 ASSAY OF FOLIC ACID SERUM: CPT

## 2025-03-18 PROCEDURE — 82306 VITAMIN D 25 HYDROXY: CPT

## 2025-03-18 PROCEDURE — 82607 VITAMIN B-12: CPT

## 2025-03-21 ENCOUNTER — RESULTS FOLLOW-UP (OUTPATIENT)
Dept: PRIMARY CARE | Facility: CLINIC | Age: 55
End: 2025-03-21

## 2025-03-21 LAB
IF BLOCK AB SER QL: NEGATIVE
METHYLMALONATE SERPL-SCNC: 129 NMOL/L (ref 55–335)
MEV IGG SER-ACNC: 216 AU/ML
MUV AB SER-ACNC: 142 AU/ML

## 2025-04-01 DIAGNOSIS — E66.09 CLASS 1 OBESITY DUE TO EXCESS CALORIES WITH SERIOUS COMORBIDITY AND BODY MASS INDEX (BMI) OF 32.0 TO 32.9 IN ADULT: ICD-10-CM

## 2025-04-01 DIAGNOSIS — E66.811 CLASS 1 OBESITY DUE TO EXCESS CALORIES WITH SERIOUS COMORBIDITY AND BODY MASS INDEX (BMI) OF 32.0 TO 32.9 IN ADULT: ICD-10-CM

## 2025-04-01 RX ORDER — TIRZEPATIDE 12.5 MG/.5ML
12.5 INJECTION, SOLUTION SUBCUTANEOUS
Qty: 2 ML | Refills: 0 | Status: SHIPPED | OUTPATIENT
Start: 2025-04-01 | End: 2025-05-01

## 2025-04-04 ENCOUNTER — RESULTS FOLLOW-UP (OUTPATIENT)
Dept: PRIMARY CARE | Facility: CLINIC | Age: 55
End: 2025-04-04

## 2025-04-04 NOTE — RESULT ENCOUNTER NOTE
Reached out via portal with results.   Lab Results:  CMP: Liver, kidney, electrolytes are normal.   Your glucose is elevated. You will want to reduce the amount of sweets, carbs you intake.   CBC: no signs of anemia or infection. Your work up for elevated MCV did  not find any abnormalities.   A1C: normal. You do not have diabetes.   Thyroid panel: Normal  Lipid panel: Your cholesterol levels are normal. Good job!  Vitamin D: Is normal.    Vitamin B12: Much improved

## 2025-04-09 ENCOUNTER — OFFICE VISIT (OUTPATIENT)
Dept: PRIMARY CARE | Facility: CLINIC | Age: 55
End: 2025-04-09
Payer: COMMERCIAL

## 2025-04-09 VITALS
OXYGEN SATURATION: 99 % | SYSTOLIC BLOOD PRESSURE: 110 MMHG | RESPIRATION RATE: 17 BRPM | HEART RATE: 69 BPM | BODY MASS INDEX: 31.69 KG/M2 | DIASTOLIC BLOOD PRESSURE: 70 MMHG | TEMPERATURE: 97.4 F | WEIGHT: 234 LBS | HEIGHT: 72 IN

## 2025-04-09 DIAGNOSIS — E66.811 CLASS 1 OBESITY DUE TO EXCESS CALORIES WITH SERIOUS COMORBIDITY AND BODY MASS INDEX (BMI) OF 32.0 TO 32.9 IN ADULT: Primary | ICD-10-CM

## 2025-04-09 DIAGNOSIS — Z23 NEED FOR MMR VACCINE: ICD-10-CM

## 2025-04-09 DIAGNOSIS — E66.09 CLASS 1 OBESITY DUE TO EXCESS CALORIES WITH SERIOUS COMORBIDITY AND BODY MASS INDEX (BMI) OF 32.0 TO 32.9 IN ADULT: Primary | ICD-10-CM

## 2025-04-09 DIAGNOSIS — D68.51 FACTOR V LEIDEN (CMS/HCC): ICD-10-CM

## 2025-04-09 DIAGNOSIS — Z12.31 ENCOUNTER FOR SCREENING MAMMOGRAM FOR MALIGNANT NEOPLASM OF BREAST: ICD-10-CM

## 2025-04-09 DIAGNOSIS — E78.2 MIXED HYPERLIPIDEMIA: ICD-10-CM

## 2025-04-09 PROBLEM — Z76.89 ENCOUNTER FOR WEIGHT MANAGEMENT: Status: RESOLVED | Noted: 2025-01-06 | Resolved: 2025-04-09

## 2025-04-09 PROCEDURE — 99214 OFFICE O/P EST MOD 30 MIN: CPT | Mod: 25 | Performed by: FAMILY MEDICINE

## 2025-04-09 PROCEDURE — 3008F BODY MASS INDEX DOCD: CPT | Performed by: FAMILY MEDICINE

## 2025-04-09 PROCEDURE — 90471 IMMUNIZATION ADMIN: CPT | Performed by: FAMILY MEDICINE

## 2025-04-09 PROCEDURE — 90707 MMR VACCINE SC: CPT | Performed by: FAMILY MEDICINE

## 2025-04-09 ASSESSMENT — ENCOUNTER SYMPTOMS
DIZZINESS: 0
PALPITATIONS: 0
FREQUENCY: 0
FATIGUE: 0
ABDOMINAL PAIN: 0
BLOOD IN STOOL: 0
CHILLS: 0
SORE THROAT: 0
HEADACHES: 0
NERVOUS/ANXIOUS: 0
WHEEZING: 0
DYSURIA: 0
FEVER: 0
RHINORRHEA: 0
MYALGIAS: 0
HEMATURIA: 0
COUGH: 0
ARTHRALGIAS: 0
CONSTIPATION: 0
SHORTNESS OF BREATH: 0
SLEEP DISTURBANCE: 0

## 2025-04-09 NOTE — PROGRESS NOTES
Subjective      Patient ID: Bailey Ordonez is a 54 y.o. female.    She is here for follow up. She is doing pretty good overall. She has been doing the zepbound over past yr and has lost about 50lbs so far. She is tolerating the zepbound and does have smaller portions and less food noise. She is trying to follow well balanced meals. Stays well hydrated. She is doing pretty well with the bowel movements. She did have labs done back in Jan and lipid was up and now on statin and did have recheck of labs done which did look better. Her vit D and vit B12 were low but is better now with supplements. She is looking to get her weight around the low 200s. She is on second round of 12.5mg for now. Needs MMR booster.        The following have been reviewed and updated as appropriate in this visit:   Tobacco  Allergies  Meds  Problems  Med Hx  Surg Hx  Fam Hx       Review of Systems   Constitutional:  Negative for chills, fatigue and fever.   HENT:  Negative for ear discharge, ear pain, postnasal drip, rhinorrhea and sore throat.    Respiratory:  Negative for cough, shortness of breath and wheezing.    Cardiovascular:  Negative for chest pain and palpitations.   Gastrointestinal:  Negative for abdominal pain, blood in stool and constipation.   Genitourinary:  Negative for dysuria, frequency and hematuria.   Musculoskeletal:  Negative for arthralgias and myalgias.   Skin:  Negative for rash.   Neurological:  Negative for dizziness and headaches.   Psychiatric/Behavioral:  Negative for sleep disturbance and suicidal ideas. The patient is not nervous/anxious.        Current Outpatient Medications   Medication Sig Dispense Refill    ALPRAZolam (XANAX) 0.5 mg tablet TAKE 1 TABLET BY MOUTH EVERY DAY AS NEEDED FOR ANXIETY 30 tablet 0    B.animalis,bifid,infantis,long (PROBIOTIC 4X ORAL) Probiotic      cholecalciferol, vitD3,/vit K2 (vitamin D3-vitamin K2) 125-90 mcg capsule Take 5,000 Units by mouth daily. Taking 2 tablet  daily      estradioL (VIVELLE-DOT) 0.025 mg/24 hr semiweekly patch APPLY 1 PATCH TOPICALLY TO SKIN TWICE A WEEK      magnesium 200 mg tablet Take by mouth.      metFORMIN XR (GLUCOPHAGE-XR) 500 mg 24 hr tablet TAKE 1 TABLET BY MOUTH IN THE EVENING WITH FOOD      rosuvastatin (CRESTOR) 5 mg tablet Take 1 tablet (5 mg total) by mouth daily. 90 tablet 1    tirzepatide, weight loss, (ZEPBOUND) 12.5 mg/0.5 mL subcutaneous PEN injector INJECT 0.5 ML (12.5 MG TOTAL) UNDER THE SKIN EVERY (SEVEN) 7 DAYS. 2 mL 0    valACYclovir (VALTREX) 1 gram tablet TAKE 1 TABLET BY MOUTH 3 TIMES A DAY AS NEEDED (COLD SORES) FOR UP TO 7 DAYS 30 tablet 0    vitamin B complex (B COMPLEX 1 ORAL) B Complex       No current facility-administered medications for this visit.     Past Medical History:   Diagnosis Date    Anxiety and depression     Factor V Leiden (CMS/HCC)     Osteoarthritis of both hips     Pneumonia 03/2023     Family History   Problem Relation Name Age of Onset    Heart disease Biological Mother      Hypertension Biological Mother      Atrial fibrillation Biological Mother      Heart failure Biological Mother      Asthma Biological Mother      Factor V Leiden deficiency Biological Mother      Pancreatic cancer Biological Father      Factor V Leiden deficiency Biological Brother       Past Surgical History   Procedure Laterality Date    Colonoscopy  09/2016    Knee surgery      Tonsillectomy      Total hip arthroplasty Bilateral     L 2019 and R 2021     Social History     Socioeconomic History    Marital status:      Spouse name: Not on file    Number of children: 3    Years of education: Not on file    Highest education level: Not on file   Occupational History    Occupation: School Admin   Tobacco Use    Smoking status: Never    Smokeless tobacco: Never   Vaping Use    Vaping status: Never Used   Substance and Sexual Activity    Alcohol use: Yes     Comment: social     Drug use: Never    Sexual activity: Yes   Other  Topics Concern    Not on file   Social History Narrative    Not on file     Social Drivers of Health     Financial Resource Strain: Not on file   Food Insecurity: Not on file   Transportation Needs: Not on file   Physical Activity: Not on file   Stress: Not on file   Social Connections: Not on file   Intimate Partner Violence: Not on file   Housing Stability: Not on file     Allergies   Allergen Reactions    Prochlorperazine Other (see comments)     Seizure          Objective   Visit Vitals  /70 (BP Location: Left upper arm, Patient Position: Sitting)   Pulse 69   Temp 36.3 °C (97.4 °F) (Temporal)   Resp 17   Ht 1.829 m (6')   Wt 106 kg (234 lb)   SpO2 99%   BMI 31.74 kg/m²     Physical Exam  Vitals and nursing note reviewed.   Constitutional:       Appearance: Normal appearance.   Cardiovascular:      Rate and Rhythm: Normal rate and regular rhythm.      Heart sounds: No murmur heard.  Pulmonary:      Effort: Pulmonary effort is normal.      Breath sounds: Normal breath sounds. No wheezing.   Abdominal:      General: Bowel sounds are normal.      Palpations: Abdomen is soft.      Tenderness: There is no abdominal tenderness.   Musculoskeletal:         General: Normal range of motion.      Cervical back: Normal range of motion and neck supple.   Skin:     General: Skin is warm.      Findings: No rash.   Neurological:      General: No focal deficit present.      Mental Status: She is alert and oriented to person, place, and time.      Cranial Nerves: No cranial nerve deficit.   Psychiatric:         Mood and Affect: Mood normal.         Behavior: Behavior normal.         Assessment/Plan   Problem List Items Addressed This Visit          Endocrine/Metabolic    Class 1 obesity due to excess calories with serious comorbidity and body mass index (BMI) of 32.0 to 32.9 in adult - Primary       Hematologic    Factor V Leiden (CMS/HCC)    Stable at this time            Other    Mixed hyperlipidemia     Other Visit  Diagnoses         Need for MMR vaccine        Relevant Orders    MMR vaccine (PRIORIX, MMR) SubQ (Completed)      Encounter for screening mammogram for malignant neoplasm of breast        Relevant Orders    BI SCREENING MAMMOGRAM BILATERAL(TOMOSYNTHESIS)        She is doing ok overall. We discussed cont diet and exercise. She will cont to work on weight loss. She has lost over 50lbs since zepbound and tolerating it well. We will cont current dose at this time. She will be back for physical in few months.     Layne Kramer, DO  4/9/2025

## 2025-04-30 DIAGNOSIS — F32.A ANXIETY AND DEPRESSION: ICD-10-CM

## 2025-04-30 DIAGNOSIS — E66.811 CLASS 1 OBESITY DUE TO EXCESS CALORIES WITH SERIOUS COMORBIDITY AND BODY MASS INDEX (BMI) OF 32.0 TO 32.9 IN ADULT: ICD-10-CM

## 2025-04-30 DIAGNOSIS — E66.09 CLASS 1 OBESITY DUE TO EXCESS CALORIES WITH SERIOUS COMORBIDITY AND BODY MASS INDEX (BMI) OF 32.0 TO 32.9 IN ADULT: ICD-10-CM

## 2025-04-30 DIAGNOSIS — F41.9 ANXIETY AND DEPRESSION: ICD-10-CM

## 2025-05-01 VITALS — WEIGHT: 230 LBS | BODY MASS INDEX: 31.19 KG/M2

## 2025-05-01 RX ORDER — TIRZEPATIDE 12.5 MG/.5ML
12.5 INJECTION, SOLUTION SUBCUTANEOUS
Qty: 2 ML | Refills: 0 | Status: SHIPPED | OUTPATIENT
Start: 2025-05-01 | End: 2025-05-27 | Stop reason: DRUGHIGH

## 2025-05-02 RX ORDER — ALPRAZOLAM 0.5 MG/1
0.5 TABLET ORAL DAILY PRN
Qty: 30 TABLET | Refills: 0 | Status: SHIPPED | OUTPATIENT
Start: 2025-05-02

## 2025-05-19 ENCOUNTER — HOSPITAL ENCOUNTER (OUTPATIENT)
Dept: RADIOLOGY | Facility: CLINIC | Age: 55
Discharge: HOME | End: 2025-05-19
Attending: FAMILY MEDICINE
Payer: COMMERCIAL

## 2025-05-19 DIAGNOSIS — Z12.31 ENCOUNTER FOR SCREENING MAMMOGRAM FOR MALIGNANT NEOPLASM OF BREAST: ICD-10-CM

## 2025-05-19 PROCEDURE — 77067 SCR MAMMO BI INCL CAD: CPT

## 2025-05-21 ENCOUNTER — RESULTS FOLLOW-UP (OUTPATIENT)
Dept: PRIMARY CARE | Facility: CLINIC | Age: 55
End: 2025-05-21

## 2025-05-27 VITALS — BODY MASS INDEX: 30.8 KG/M2 | WEIGHT: 227.1 LBS

## 2025-05-27 DIAGNOSIS — E66.09 CLASS 1 OBESITY DUE TO EXCESS CALORIES WITH SERIOUS COMORBIDITY AND BODY MASS INDEX (BMI) OF 32.0 TO 32.9 IN ADULT: ICD-10-CM

## 2025-05-27 DIAGNOSIS — E66.811 CLASS 1 OBESITY DUE TO EXCESS CALORIES WITH SERIOUS COMORBIDITY AND BODY MASS INDEX (BMI) OF 32.0 TO 32.9 IN ADULT: ICD-10-CM

## 2025-05-27 RX ORDER — TIRZEPATIDE 12.5 MG/.5ML
12.5 INJECTION, SOLUTION SUBCUTANEOUS
OUTPATIENT
Start: 2025-05-27

## 2025-06-17 RX ORDER — VALACYCLOVIR HYDROCHLORIDE 1 G/1
TABLET, FILM COATED ORAL
Qty: 30 TABLET | Refills: 0 | Status: SHIPPED | OUTPATIENT
Start: 2025-06-17

## 2025-06-17 RX ORDER — VALACYCLOVIR HYDROCHLORIDE 1 G/1
TABLET, FILM COATED ORAL
Qty: 30 TABLET | Refills: 0 | OUTPATIENT
Start: 2025-06-17

## 2025-06-30 VITALS — WEIGHT: 229 LBS | BODY MASS INDEX: 31.06 KG/M2

## 2025-06-30 RX ORDER — TIRZEPATIDE 15 MG/.5ML
INJECTION, SOLUTION SUBCUTANEOUS
Qty: 2 ML | Refills: 0 | Status: SHIPPED | OUTPATIENT
Start: 2025-06-30

## 2025-07-03 DIAGNOSIS — E78.2 MIXED HYPERLIPIDEMIA: ICD-10-CM

## 2025-07-03 RX ORDER — ROSUVASTATIN CALCIUM 5 MG/1
5 TABLET, COATED ORAL DAILY
Qty: 90 TABLET | Refills: 1 | Status: SHIPPED | OUTPATIENT
Start: 2025-07-03 | End: 2025-12-30

## 2025-07-12 SDOH — ECONOMIC STABILITY: FOOD INSECURITY: WITHIN THE PAST 12 MONTHS, THE FOOD YOU BOUGHT JUST DIDN'T LAST AND YOU DIDN'T HAVE MONEY TO GET MORE.: NEVER TRUE

## 2025-07-12 SDOH — ECONOMIC STABILITY: TRANSPORTATION INSECURITY
IN THE PAST 12 MONTHS, HAS THE LACK OF TRANSPORTATION KEPT YOU FROM MEDICAL APPOINTMENTS OR FROM GETTING MEDICATIONS?: NO

## 2025-07-12 SDOH — ECONOMIC STABILITY: INCOME INSECURITY: IN THE LAST 12 MONTHS, WAS THERE A TIME WHEN YOU WERE NOT ABLE TO PAY THE MORTGAGE OR RENT ON TIME?: NO

## 2025-07-12 SDOH — ECONOMIC STABILITY: FOOD INSECURITY: WITHIN THE PAST 12 MONTHS, YOU WORRIED THAT YOUR FOOD WOULD RUN OUT BEFORE YOU GOT MONEY TO BUY MORE.: NEVER TRUE

## 2025-07-12 SDOH — ECONOMIC STABILITY: TRANSPORTATION INSECURITY
IN THE PAST 12 MONTHS, HAS LACK OF TRANSPORTATION KEPT YOU FROM MEETINGS, WORK, OR FROM GETTING THINGS NEEDED FOR DAILY LIVING?: NO

## 2025-07-12 ASSESSMENT — SOCIAL DETERMINANTS OF HEALTH (SDOH): IN THE PAST 12 MONTHS, HAS THE ELECTRIC, GAS, OIL, OR WATER COMPANY THREATENED TO SHUT OFF SERVICE IN YOUR HOME?: NO

## 2025-07-17 ENCOUNTER — OFFICE VISIT (OUTPATIENT)
Dept: PRIMARY CARE | Facility: CLINIC | Age: 55
End: 2025-07-17
Payer: COMMERCIAL

## 2025-07-17 VITALS
WEIGHT: 227.4 LBS | TEMPERATURE: 97.5 F | OXYGEN SATURATION: 98 % | SYSTOLIC BLOOD PRESSURE: 110 MMHG | DIASTOLIC BLOOD PRESSURE: 80 MMHG | BODY MASS INDEX: 30.8 KG/M2 | HEART RATE: 80 BPM | RESPIRATION RATE: 17 BRPM | HEIGHT: 72 IN

## 2025-07-17 DIAGNOSIS — E78.2 MIXED HYPERLIPIDEMIA: ICD-10-CM

## 2025-07-17 DIAGNOSIS — D68.51 FACTOR V LEIDEN (CMS/HCC): ICD-10-CM

## 2025-07-17 DIAGNOSIS — E66.09 CLASS 1 OBESITY DUE TO EXCESS CALORIES WITH SERIOUS COMORBIDITY AND BODY MASS INDEX (BMI) OF 30.0 TO 30.9 IN ADULT: ICD-10-CM

## 2025-07-17 DIAGNOSIS — S31.829A WOUND OF BUTTOCK, LEFT, INITIAL ENCOUNTER: ICD-10-CM

## 2025-07-17 DIAGNOSIS — Z00.00 ROUTINE PHYSICAL EXAMINATION: Primary | ICD-10-CM

## 2025-07-17 DIAGNOSIS — F41.9 ANXIETY: ICD-10-CM

## 2025-07-17 DIAGNOSIS — E66.811 CLASS 1 OBESITY DUE TO EXCESS CALORIES WITH SERIOUS COMORBIDITY AND BODY MASS INDEX (BMI) OF 30.0 TO 30.9 IN ADULT: ICD-10-CM

## 2025-07-17 PROBLEM — N92.6 MENSTRUATION, ABNORMAL: Status: RESOLVED | Noted: 2025-01-06 | Resolved: 2025-07-17

## 2025-07-17 PROCEDURE — 99396 PREV VISIT EST AGE 40-64: CPT | Performed by: FAMILY MEDICINE

## 2025-07-17 PROCEDURE — 3008F BODY MASS INDEX DOCD: CPT | Performed by: FAMILY MEDICINE

## 2025-07-17 RX ORDER — ESTRADIOL 0.03 MG/D
1 FILM, EXTENDED RELEASE TRANSDERMAL 2 TIMES WEEKLY
Qty: 8 PATCH | Refills: 0 | Status: SHIPPED | OUTPATIENT
Start: 2025-07-17

## 2025-07-17 RX ORDER — ROSUVASTATIN CALCIUM 5 MG/1
5 TABLET, COATED ORAL DAILY
Start: 2025-07-17 | End: 2026-01-13

## 2025-07-17 ASSESSMENT — ENCOUNTER SYMPTOMS
DIZZINESS: 0
FREQUENCY: 0
COUGH: 0
HEMATURIA: 0
FATIGUE: 0
DYSURIA: 0
CHILLS: 0
ABDOMINAL PAIN: 0
CONSTIPATION: 0
MYALGIAS: 0
FEVER: 0
SHORTNESS OF BREATH: 0
SORE THROAT: 0
WHEEZING: 0
BLOOD IN STOOL: 0
NERVOUS/ANXIOUS: 0
PALPITATIONS: 0
HEADACHES: 0
ARTHRALGIAS: 0
RHINORRHEA: 0
SLEEP DISTURBANCE: 0

## 2025-07-17 ASSESSMENT — PATIENT HEALTH QUESTIONNAIRE - PHQ9: SUM OF ALL RESPONSES TO PHQ9 QUESTIONS 1 & 2: 0

## 2025-07-17 NOTE — PROGRESS NOTES
Subjective      Patient ID: Bailey Ordonez is a 54 y.o. female.    She is here for physical. She is doing pretty good overall. She is trying to follow well balanced meals- does get 2 decent meals a day. Tolerating the zepboboundStays well hydrated. She is active pelethon and walking and will add in the weights. Average 8hrs of sleep a night but now down to 6 b/c of the HRT patch stopped but looking to get back on it and see new ob/gyn. Normal BMS and urination. Due for colonoscopy. UTD with eye and dental exam. UTD with mammo. UTD with derm. Due for fasting labs. She is continuing to lose weight on the zepbound and feeling good. Due for Tdap.         The following have been reviewed and updated as appropriate in this visit:   Allergies  Meds  Problems  Med Hx  Surg Hx  Fam Hx       Review of Systems   Constitutional:  Negative for chills, fatigue and fever.   HENT:  Negative for ear discharge, ear pain, postnasal drip, rhinorrhea and sore throat.    Respiratory:  Negative for cough, shortness of breath and wheezing.    Cardiovascular:  Negative for chest pain and palpitations.   Gastrointestinal:  Negative for abdominal pain, blood in stool and constipation.   Genitourinary:  Negative for dysuria, frequency and hematuria.   Musculoskeletal:  Negative for arthralgias and myalgias.   Skin:  Negative for rash.   Neurological:  Negative for dizziness and headaches.   Psychiatric/Behavioral:  Negative for sleep disturbance and suicidal ideas. The patient is not nervous/anxious.        Current Outpatient Medications   Medication Sig Dispense Refill    ALPRAZolam (XANAX) 0.5 mg tablet TAKE 1 TABLET BY MOUTH EVERY DAY AS NEEDED FOR ANXIETY 30 tablet 0    B.animalis,bifid,infantis,long (PROBIOTIC 4X ORAL) Probiotic      cholecalciferol, vitD3,/vit K2 (vitamin D3-vitamin K2) 125-90 mcg capsule Take 5,000 Units by mouth daily. Taking 2 tablet daily      estradioL (VIVELLE-DOT) 0.025 mg/24 hr semiweekly patch Place  1 patch (0.025 mg total) on the skin 2 (two) times a week (Mon, Thu). 8 patch 0    magnesium 200 mg tablet Take by mouth.      metFORMIN XR (GLUCOPHAGE-XR) 500 mg 24 hr tablet TAKE 1 TABLET BY MOUTH IN THE EVENING WITH FOOD      rosuvastatin (CRESTOR) 5 mg tablet Take 1 tablet (5 mg total) by mouth daily. Takes 3 days a week      tirzepatide, weight loss, (ZEPBOUND) 15 mg/0.5 mL subcutaneous PEN injector INJECT 0.5 ML (15 MG TOTAL) UNDER THE SKIN EVERY (SEVEN) 7 DAYS. 2 mL 0    valACYclovir (VALTREX) 1 gram tablet TAKE 1 TABLET (1,000 MG TOTAL) BY MOUTH 3 (THREE) TIMES A DAY AS NEEDED (COLD SORES) FOR UP TO 7 DAYS. 30 tablet 0    vitamin B complex (B COMPLEX 1 ORAL) B Complex       No current facility-administered medications for this visit.     Past Medical History:   Diagnosis Date    Anxiety and depression     Factor V Leiden (CMS/HCC)     Osteoarthritis of both hips     Pneumonia 03/2023     Family History   Problem Relation Name Age of Onset    Heart disease Biological Mother      Hypertension Biological Mother      Atrial fibrillation Biological Mother      Heart failure Biological Mother      Asthma Biological Mother      Factor V Leiden deficiency Biological Mother      Pancreatic cancer Biological Father      Factor V Leiden deficiency Biological Brother       Past Surgical History   Procedure Laterality Date    Colonoscopy  09/2016    Knee surgery      Tonsillectomy      Total hip arthroplasty Bilateral     L 2019 and R 2021     Social History     Socioeconomic History    Marital status:      Spouse name: Not on file    Number of children: 3    Years of education: Not on file    Highest education level: Not on file   Occupational History    Occupation: School Admin   Tobacco Use    Smoking status: Never    Smokeless tobacco: Never   Vaping Use    Vaping status: Never Used   Substance and Sexual Activity    Alcohol use: Yes     Comment: social     Drug use: Never    Sexual activity: Yes   Other  Topics Concern    Not on file   Social History Narrative    Not on file     Social Drivers of Health     Financial Resource Strain: Not on file   Food Insecurity: No Food Insecurity (7/12/2025)    Hunger Vital Sign     Worried About Running Out of Food in the Last Year: Never true     Ran Out of Food in the Last Year: Never true   Transportation Needs: No Transportation Needs (7/12/2025)    PRAPARE - Transportation     Lack of Transportation (Medical): No     Lack of Transportation (Non-Medical): No   Physical Activity: Not on file   Stress: Not on file   Social Connections: Not on file   Intimate Partner Violence: Not on file   Housing Stability: Low Risk  (7/12/2025)    Housing Stability Vital Sign     Unable to Pay for Housing in the Last Year: No     Number of Times Moved in the Last Year: 0     Homeless in the Last Year: No     Allergies   Allergen Reactions    Prochlorperazine Other (see comments)     Seizure          Objective   Visit Vitals  /80 (BP Location: Left upper arm, Patient Position: Sitting)   Pulse 80   Temp 36.4 °C (97.5 °F) (Temporal)   Resp 17   Ht 1.829 m (6')   Wt 103 kg (227 lb 6.4 oz)   SpO2 98%   BMI 30.84 kg/m²     Physical Exam  Vitals and nursing note reviewed.   Constitutional:       Appearance: Normal appearance.   HENT:      Right Ear: Tympanic membrane normal. There is no impacted cerumen.      Left Ear: Tympanic membrane normal. There is no impacted cerumen.      Nose: Nose normal. No rhinorrhea.      Mouth/Throat:      Mouth: Mucous membranes are moist.      Pharynx: Oropharynx is clear. No posterior oropharyngeal erythema.   Cardiovascular:      Rate and Rhythm: Normal rate and regular rhythm.      Heart sounds: No murmur heard.  Pulmonary:      Effort: Pulmonary effort is normal.      Breath sounds: Normal breath sounds. No wheezing.   Abdominal:      General: Bowel sounds are normal.      Palpations: Abdomen is soft.      Tenderness: There is no abdominal tenderness.    Musculoskeletal:         General: Normal range of motion.      Cervical back: Normal range of motion and neck supple.   Skin:     General: Skin is warm.      Findings: No rash.      Comments: L inner buttock pencil size open wound seen but no infection or discharge at this time   Neurological:      General: No focal deficit present.      Mental Status: She is alert and oriented to person, place, and time.      Cranial Nerves: No cranial nerve deficit.   Psychiatric:         Mood and Affect: Mood normal.         Behavior: Behavior normal.         Assessment/Plan   Problem List Items Addressed This Visit          Endocrine/Metabolic    Class 1 obesity due to excess calories with serious comorbidity and body mass index (BMI) of 30.0 to 30.9 in adult       Hematologic    Factor V Leiden (CMS/HCC)    Stable at this time.             Mental Health    Anxiety       Other    Mixed hyperlipidemia    Relevant Medications    rosuvastatin (CRESTOR) 5 mg tablet     Other Visit Diagnoses         Routine physical examination    -  Primary    Relevant Orders    Comprehensive metabolic panel    Hemoglobin A1c    TSH    CBC    Lipid panel      Wound of buttock, left, initial encounter            She is doing pretty good overall. We discussed cont diet and exercise. She has lost over 60lbs since starting zepbound and will cont this. UTD with eye and dental exam. UTD with derm. UTD with mammo. She will do colonoscopy and see general surgeon for wound check in buttock region and keep me updated. She will get Tdap in near future. Will cont current meds. Will refill the estradiol patch for her until she gets in with ob/gyn - she will let me know. Will recheck labs in the fall.     Layne Kramer, DO  7/17/2025

## 2025-07-28 RX ORDER — TIRZEPATIDE 15 MG/.5ML
INJECTION, SOLUTION SUBCUTANEOUS
OUTPATIENT
Start: 2025-07-28

## 2025-08-19 RX ORDER — ESTRADIOL 0.03 MG/D
1 FILM, EXTENDED RELEASE TRANSDERMAL 2 TIMES WEEKLY
Qty: 24 PATCH | Refills: 0 | Status: SHIPPED | OUTPATIENT
Start: 2025-08-21